# Patient Record
Sex: FEMALE | Race: WHITE | NOT HISPANIC OR LATINO | Employment: OTHER | URBAN - METROPOLITAN AREA
[De-identification: names, ages, dates, MRNs, and addresses within clinical notes are randomized per-mention and may not be internally consistent; named-entity substitution may affect disease eponyms.]

---

## 2019-07-11 ENCOUNTER — HOSPITAL ENCOUNTER (OUTPATIENT)
Facility: HOSPITAL | Age: 69
Setting detail: OBSERVATION
LOS: 1 days | Discharge: HOME | End: 2019-07-12
Attending: EMERGENCY MEDICINE | Admitting: INTERNAL MEDICINE

## 2019-07-11 ENCOUNTER — APPOINTMENT (OUTPATIENT)
Dept: CT IMAGING | Facility: HOSPITAL | Age: 69
End: 2019-07-11

## 2019-07-11 DIAGNOSIS — E86.0 DEHYDRATION: ICD-10-CM

## 2019-07-11 DIAGNOSIS — E87.0 ACUTE HYPERNATREMIA: ICD-10-CM

## 2019-07-11 DIAGNOSIS — R55 SYNCOPE, UNSPECIFIED SYNCOPE TYPE: Primary | ICD-10-CM

## 2019-07-11 DIAGNOSIS — G30.1 LATE ONSET ALZHEIMER'S DISEASE WITHOUT BEHAVIORAL DISTURBANCE (HCC): ICD-10-CM

## 2019-07-11 DIAGNOSIS — F02.80 LATE ONSET ALZHEIMER'S DISEASE WITHOUT BEHAVIORAL DISTURBANCE (HCC): ICD-10-CM

## 2019-07-11 PROBLEM — R33.8 ACUTE URINARY RETENTION: Status: ACTIVE | Noted: 2019-07-11

## 2019-07-11 PROBLEM — G30.0 EARLY ONSET ALZHEIMER'S DEMENTIA (HCC): Status: ACTIVE | Noted: 2019-07-11

## 2019-07-11 PROBLEM — N39.490 OVERFLOW INCONTINENCE: Status: ACTIVE | Noted: 2019-07-11

## 2019-07-11 LAB
ALBUMIN SERPL-MCNC: 3.7 G/DL (ref 3.5–5.2)
ALBUMIN/GLOB SERPL: 1.4 G/DL
ALP SERPL-CCNC: 116 U/L (ref 39–117)
ALT SERPL W P-5'-P-CCNC: 20 U/L (ref 1–33)
ANION GAP SERPL CALCULATED.3IONS-SCNC: 10.2 MMOL/L (ref 5–15)
AST SERPL-CCNC: 26 U/L (ref 1–32)
BASOPHILS # BLD AUTO: 0.03 10*3/MM3 (ref 0–0.2)
BASOPHILS NFR BLD AUTO: 0.5 % (ref 0–1.5)
BILIRUB SERPL-MCNC: 0.4 MG/DL (ref 0.2–1.2)
BILIRUB UR QL STRIP: NEGATIVE
BUN BLD-MCNC: 16 MG/DL (ref 8–23)
BUN/CREAT SERPL: 16.8 (ref 7–25)
CALCIUM SPEC-SCNC: 8.8 MG/DL (ref 8.6–10.5)
CHLORIDE SERPL-SCNC: 110 MMOL/L (ref 98–107)
CLARITY UR: CLEAR
CO2 SERPL-SCNC: 28.8 MMOL/L (ref 22–29)
COLOR UR: YELLOW
CREAT BLD-MCNC: 0.95 MG/DL (ref 0.57–1)
DEPRECATED RDW RBC AUTO: 50.3 FL (ref 37–54)
EOSINOPHIL # BLD AUTO: 0.19 10*3/MM3 (ref 0–0.4)
EOSINOPHIL NFR BLD AUTO: 3 % (ref 0.3–6.2)
ERYTHROCYTE [DISTWIDTH] IN BLOOD BY AUTOMATED COUNT: 14 % (ref 12.3–15.4)
ETHANOL BLD-MCNC: <10 MG/DL (ref 0–10)
ETHANOL UR QL: <0.01 %
GFR SERPL CREATININE-BSD FRML MDRD: 58 ML/MIN/1.73
GLOBULIN UR ELPH-MCNC: 2.7 GM/DL
GLUCOSE BLD-MCNC: 111 MG/DL (ref 65–99)
GLUCOSE BLDC GLUCOMTR-MCNC: 119 MG/DL (ref 70–130)
GLUCOSE UR STRIP-MCNC: NEGATIVE MG/DL
HCT VFR BLD AUTO: 42 % (ref 34–46.6)
HGB BLD-MCNC: 13.2 G/DL (ref 12–15.9)
HGB UR QL STRIP.AUTO: NEGATIVE
IMM GRANULOCYTES # BLD AUTO: 0.02 10*3/MM3 (ref 0–0.05)
IMM GRANULOCYTES NFR BLD AUTO: 0.3 % (ref 0–0.5)
INR PPP: 1.19 (ref 0.9–1.1)
KETONES UR QL STRIP: NEGATIVE
LEUKOCYTE ESTERASE UR QL STRIP.AUTO: NEGATIVE
LYMPHOCYTES # BLD AUTO: 1.79 10*3/MM3 (ref 0.7–3.1)
LYMPHOCYTES NFR BLD AUTO: 28 % (ref 19.6–45.3)
MCH RBC QN AUTO: 30.8 PG (ref 26.6–33)
MCHC RBC AUTO-ENTMCNC: 31.4 G/DL (ref 31.5–35.7)
MCV RBC AUTO: 97.9 FL (ref 79–97)
MONOCYTES # BLD AUTO: 0.7 10*3/MM3 (ref 0.1–0.9)
MONOCYTES NFR BLD AUTO: 11 % (ref 5–12)
NEUTROPHILS # BLD AUTO: 3.66 10*3/MM3 (ref 1.7–7)
NEUTROPHILS NFR BLD AUTO: 57.2 % (ref 42.7–76)
NITRITE UR QL STRIP: NEGATIVE
NRBC BLD AUTO-RTO: 0 /100 WBC (ref 0–0.2)
PH UR STRIP.AUTO: 7 [PH] (ref 5–8)
PLATELET # BLD AUTO: 262 10*3/MM3 (ref 140–450)
PMV BLD AUTO: 9.9 FL (ref 6–12)
POTASSIUM BLD-SCNC: 3.9 MMOL/L (ref 3.5–5.2)
PROT SERPL-MCNC: 6.4 G/DL (ref 6–8.5)
PROT UR QL STRIP: NEGATIVE
PROTHROMBIN TIME: 14.8 SECONDS (ref 11.7–14.2)
RBC # BLD AUTO: 4.29 10*6/MM3 (ref 3.77–5.28)
SODIUM BLD-SCNC: 149 MMOL/L (ref 136–145)
SP GR UR STRIP: >=1.03 (ref 1–1.03)
UROBILINOGEN UR QL STRIP: NORMAL
WBC NRBC COR # BLD: 6.39 10*3/MM3 (ref 3.4–10.8)

## 2019-07-11 PROCEDURE — 0042T HC CT CEREBRAL PERFUSION W/WO CONTRAST: CPT

## 2019-07-11 PROCEDURE — 85025 COMPLETE CBC W/AUTO DIFF WBC: CPT | Performed by: EMERGENCY MEDICINE

## 2019-07-11 PROCEDURE — 80307 DRUG TEST PRSMV CHEM ANLYZR: CPT | Performed by: EMERGENCY MEDICINE

## 2019-07-11 PROCEDURE — 99205 OFFICE O/P NEW HI 60 MIN: CPT | Performed by: PSYCHIATRY & NEUROLOGY

## 2019-07-11 PROCEDURE — 70498 CT ANGIOGRAPHY NECK: CPT

## 2019-07-11 PROCEDURE — 81003 URINALYSIS AUTO W/O SCOPE: CPT | Performed by: EMERGENCY MEDICINE

## 2019-07-11 PROCEDURE — 0 IOPAMIDOL PER 1 ML: Performed by: EMERGENCY MEDICINE

## 2019-07-11 PROCEDURE — 96372 THER/PROPH/DIAG INJ SC/IM: CPT

## 2019-07-11 PROCEDURE — 92610 EVALUATE SWALLOWING FUNCTION: CPT

## 2019-07-11 PROCEDURE — 51702 INSERT TEMP BLADDER CATH: CPT

## 2019-07-11 PROCEDURE — 85610 PROTHROMBIN TIME: CPT | Performed by: EMERGENCY MEDICINE

## 2019-07-11 PROCEDURE — 80053 COMPREHEN METABOLIC PANEL: CPT | Performed by: EMERGENCY MEDICINE

## 2019-07-11 PROCEDURE — 82565 ASSAY OF CREATININE: CPT

## 2019-07-11 PROCEDURE — 70496 CT ANGIOGRAPHY HEAD: CPT

## 2019-07-11 PROCEDURE — 82962 GLUCOSE BLOOD TEST: CPT

## 2019-07-11 PROCEDURE — 93005 ELECTROCARDIOGRAM TRACING: CPT | Performed by: EMERGENCY MEDICINE

## 2019-07-11 PROCEDURE — 25010000002 ENOXAPARIN PER 10 MG: Performed by: INTERNAL MEDICINE

## 2019-07-11 PROCEDURE — 99285 EMERGENCY DEPT VISIT HI MDM: CPT

## 2019-07-11 PROCEDURE — 93010 ELECTROCARDIOGRAM REPORT: CPT | Performed by: INTERNAL MEDICINE

## 2019-07-11 RX ORDER — MULTIVITAMIN
1 TABLET ORAL DAILY
Status: ON HOLD | COMMUNITY
End: 2021-03-14

## 2019-07-11 RX ORDER — DONEPEZIL HYDROCHLORIDE 10 MG/1
10 TABLET, FILM COATED ORAL NIGHTLY
Status: DISCONTINUED | OUTPATIENT
Start: 2019-07-11 | End: 2019-07-12 | Stop reason: HOSPADM

## 2019-07-11 RX ORDER — BUSPIRONE HYDROCHLORIDE 15 MG/1
15 TABLET ORAL 3 TIMES DAILY
Status: ON HOLD | COMMUNITY
End: 2021-03-14

## 2019-07-11 RX ORDER — ACYCLOVIR 200 MG/1
CAPSULE ORAL
COMMUNITY
End: 2019-07-12 | Stop reason: HOSPADM

## 2019-07-11 RX ORDER — ATORVASTATIN CALCIUM 20 MG/1
20 TABLET, FILM COATED ORAL DAILY
Status: DISCONTINUED | OUTPATIENT
Start: 2019-07-11 | End: 2019-07-12 | Stop reason: HOSPADM

## 2019-07-11 RX ORDER — MEMANTINE HYDROCHLORIDE 10 MG/1
10 TABLET ORAL EVERY 12 HOURS SCHEDULED
Status: DISCONTINUED | OUTPATIENT
Start: 2019-07-11 | End: 2019-07-12 | Stop reason: HOSPADM

## 2019-07-11 RX ORDER — ATORVASTATIN CALCIUM 20 MG/1
20 TABLET, FILM COATED ORAL DAILY
Status: ON HOLD | COMMUNITY
End: 2021-03-14

## 2019-07-11 RX ORDER — SODIUM CHLORIDE 0.9 % (FLUSH) 0.9 %
3 SYRINGE (ML) INJECTION EVERY 12 HOURS SCHEDULED
Status: DISCONTINUED | OUTPATIENT
Start: 2019-07-11 | End: 2019-07-12 | Stop reason: HOSPADM

## 2019-07-11 RX ORDER — ALPRAZOLAM 1 MG/1
1 TABLET ORAL 2 TIMES DAILY PRN
Status: ON HOLD | COMMUNITY
End: 2021-03-14

## 2019-07-11 RX ORDER — RISPERIDONE 0.5 MG/1
0.5 TABLET ORAL 2 TIMES DAILY
COMMUNITY
End: 2019-07-12 | Stop reason: HOSPADM

## 2019-07-11 RX ORDER — BUSPIRONE HYDROCHLORIDE 15 MG/1
15 TABLET ORAL 3 TIMES DAILY
Status: DISCONTINUED | OUTPATIENT
Start: 2019-07-11 | End: 2019-07-12 | Stop reason: HOSPADM

## 2019-07-11 RX ORDER — ONDANSETRON 4 MG/1
4 TABLET, FILM COATED ORAL EVERY 6 HOURS PRN
Status: DISCONTINUED | OUTPATIENT
Start: 2019-07-11 | End: 2019-07-12 | Stop reason: HOSPADM

## 2019-07-11 RX ORDER — SODIUM CHLORIDE 0.9 % (FLUSH) 0.9 %
10 SYRINGE (ML) INJECTION AS NEEDED
Status: DISCONTINUED | OUTPATIENT
Start: 2019-07-11 | End: 2019-07-12 | Stop reason: HOSPADM

## 2019-07-11 RX ORDER — MEMANTINE HYDROCHLORIDE 10 MG/1
10 TABLET ORAL 2 TIMES DAILY
Status: ON HOLD | COMMUNITY
End: 2021-03-14

## 2019-07-11 RX ORDER — PROPRANOLOL HYDROCHLORIDE 60 MG/1
60 TABLET ORAL 3 TIMES DAILY
Status: ON HOLD | COMMUNITY
End: 2019-08-18 | Stop reason: SDUPTHER

## 2019-07-11 RX ORDER — OLANZAPINE 10 MG/1
7.5 INJECTION, POWDER, LYOPHILIZED, FOR SOLUTION INTRAMUSCULAR ONCE
Status: DISCONTINUED | OUTPATIENT
Start: 2019-07-11 | End: 2019-07-12 | Stop reason: HOSPADM

## 2019-07-11 RX ORDER — MAGNESIUM GLUCONATE 27 MG(500)
27 TABLET ORAL 2 TIMES DAILY
COMMUNITY
End: 2019-07-12 | Stop reason: HOSPADM

## 2019-07-11 RX ORDER — DESOXIMETASONE 2.5 MG/G
CREAM TOPICAL 2 TIMES DAILY
COMMUNITY
End: 2019-07-12 | Stop reason: HOSPADM

## 2019-07-11 RX ORDER — DONEPEZIL HYDROCHLORIDE 10 MG/1
10 TABLET, FILM COATED ORAL NIGHTLY
COMMUNITY
End: 2019-07-12 | Stop reason: HOSPADM

## 2019-07-11 RX ORDER — ONDANSETRON 2 MG/ML
4 INJECTION INTRAMUSCULAR; INTRAVENOUS EVERY 6 HOURS PRN
Status: DISCONTINUED | OUTPATIENT
Start: 2019-07-11 | End: 2019-07-12 | Stop reason: HOSPADM

## 2019-07-11 RX ORDER — ACETAMINOPHEN 325 MG/1
650 TABLET ORAL EVERY 4 HOURS PRN
Status: DISCONTINUED | OUTPATIENT
Start: 2019-07-11 | End: 2019-07-12 | Stop reason: HOSPADM

## 2019-07-11 RX ORDER — SODIUM CHLORIDE 0.9 % (FLUSH) 0.9 %
3-10 SYRINGE (ML) INJECTION AS NEEDED
Status: DISCONTINUED | OUTPATIENT
Start: 2019-07-11 | End: 2019-07-12 | Stop reason: HOSPADM

## 2019-07-11 RX ORDER — ALPRAZOLAM 0.5 MG/1
1 TABLET ORAL 2 TIMES DAILY PRN
Status: DISCONTINUED | OUTPATIENT
Start: 2019-07-11 | End: 2019-07-12 | Stop reason: HOSPADM

## 2019-07-11 RX ORDER — RISPERIDONE 0.5 MG/1
0.5 TABLET ORAL EVERY 12 HOURS SCHEDULED
Status: DISCONTINUED | OUTPATIENT
Start: 2019-07-11 | End: 2019-07-12 | Stop reason: HOSPADM

## 2019-07-11 RX ADMIN — ENOXAPARIN SODIUM 40 MG: 40 INJECTION SUBCUTANEOUS at 18:19

## 2019-07-11 RX ADMIN — DONEPEZIL HYDROCHLORIDE 10 MG: 10 TABLET, FILM COATED ORAL at 20:45

## 2019-07-11 RX ADMIN — ALPRAZOLAM 0.5 MG: 0.5 TABLET ORAL at 18:20

## 2019-07-11 RX ADMIN — BUSPIRONE HYDROCHLORIDE 15 MG: 15 TABLET ORAL at 20:45

## 2019-07-11 RX ADMIN — Medication 1 TABLET: at 18:19

## 2019-07-11 RX ADMIN — SODIUM CHLORIDE, PRESERVATIVE FREE 3 ML: 5 INJECTION INTRAVENOUS at 20:46

## 2019-07-11 RX ADMIN — MEMANTINE HYDROCHLORIDE 10 MG: 10 TABLET, FILM COATED ORAL at 20:45

## 2019-07-11 RX ADMIN — PROPRANOLOL HYDROCHLORIDE 60 MG: 40 TABLET ORAL at 18:19

## 2019-07-11 RX ADMIN — SODIUM CHLORIDE 1000 ML: 9 INJECTION, SOLUTION INTRAVENOUS at 09:49

## 2019-07-11 RX ADMIN — IOPAMIDOL 150 ML: 755 INJECTION, SOLUTION INTRAVENOUS at 09:40

## 2019-07-11 NOTE — PLAN OF CARE
Problem: Patient Care Overview  Goal: Plan of Care Review  Outcome: Ongoing (interventions implemented as appropriate)   07/11/19 3943   Coping/Psychosocial   Plan of Care Reviewed With patient   OTHER   Outcome Summary Bedside swallow eval completed. Recommmend regular and thins, meds whole with thin. Recommend upright and slow rate. ST to s/o at this time.

## 2019-07-11 NOTE — ED PROVIDER NOTES
EMERGENCY DEPARTMENT ENCOUNTER    CHIEF COMPLAINT  Chief Complaint: AMS  History given by: patient  History limited by: AMS  Room Number: 03/03  PMD: Zee Daley MD      HPI:  Pt is a 69 y.o. female who presents complaining of AMS that was evaluated today per EMS. EMS states the pt's  called after hearing a fall and found pt unrespnsive in a puddle of urine. EMS notes the  called te PCP and he referred them to call EMS. EMS reports the p's speech was slow and slurred. EMS states hx of dementia but the  states the pt is normally healhty. Pt denies pain in extremities, HA, trouble swallowing, abd pain, and visual changes.    Duration:  today  Onset: sudden  Timing: constant  Quality: AMS  Intensity/Severity: mild  Progression: unchanged  Associated Symptoms: none specified  Aggravating Factors: unknown  Alleviating Factors: unknown  Previous Episodes: unknown  Treatment before arrival: unknown    PAST MEDICAL HISTORY  Active Ambulatory Problems     Diagnosis Date Noted   • No Active Ambulatory Problems     Resolved Ambulatory Problems     Diagnosis Date Noted   • No Resolved Ambulatory Problems     Past Medical History:   Diagnosis Date   • Dementia    • Hyperlipidemia    • Hypertension    • UTI (urinary tract infection)        PAST SURGICAL HISTORY  History reviewed. No pertinent surgical history.    FAMILY HISTORY  History reviewed. No pertinent family history.    SOCIAL HISTORY  Social History     Socioeconomic History   • Marital status:      Spouse name: Not on file   • Number of children: Not on file   • Years of education: Not on file   • Highest education level: Not on file   Tobacco Use   • Smoking status: Never Smoker   Substance and Sexual Activity   • Alcohol use: No     Frequency: Never   • Drug use: No   • Sexual activity: Defer       ALLERGIES  Cyproheptadine and Levaquin [levofloxacin]    REVIEW OF SYSTEMS  Review of Systems   Unable to perform ROS: Mental status  change   HENT: Negative for trouble swallowing.    Eyes: Negative for visual disturbance.   Gastrointestinal: Negative for abdominal pain.   Musculoskeletal: Negative for arthralgias and myalgias.   Neurological: Negative for headaches.       PHYSICAL EXAM  ED Triage Vitals   Temp Heart Rate Resp BP SpO2   07/11/19 0920 07/11/19 0913 07/11/19 0913 07/11/19 0913 07/11/19 0913   97.4 °F (36.3 °C) 55 18 102/61 95 %      Temp src Heart Rate Source Patient Position BP Location FiO2 (%)   07/11/19 0920 07/11/19 1148 07/11/19 0913 07/11/19 0913 --   Oral Monitor Lying Right arm          Physical Exam   Constitutional: No distress.   HENT:   Head: Normocephalic and atraumatic.   Eyes: EOM are normal. Pupils are equal, round, and reactive to light.   Pupils are 2mm   Neck: Normal range of motion. Neck supple.   Cardiovascular: Normal rate, regular rhythm and normal heart sounds.   Pulmonary/Chest: Effort normal and breath sounds normal. No respiratory distress.   Abdominal: Soft. Bowel sounds are normal. She exhibits no distension. There is no tenderness. There is no rebound and no guarding.   Musculoskeletal: Normal range of motion. She exhibits no edema.   Neurological: She is alert. She has normal sensation and normal strength. She displays no weakness and facial symmetry.   Pt is sleepy but awakes to questioning, pt is confused and is somewhat cooperative with commands, moderate dysarthria, no aphasia noted   Skin: Skin is warm and dry. No abrasion, no bruising and no rash noted.   Nursing note and vitals reviewed.      LAB RESULTS  Lab Results (last 24 hours)     Procedure Component Value Units Date/Time    POC Glucose Once [[id number]]  (Normal) Collected:  07/11/19 0926    Specimen:  Blood Updated:  07/11/19 0927     Glucose 119 mg/dL     CBC & Differential [[id number]] Collected:  07/11/19 0928    Specimen:  Blood Updated:  07/11/19 0934    Narrative:       The following orders were created for panel order CBC &  Differential.  Procedure                               Abnormality         Status                     ---------                               -----------         ------                     CBC Auto Differential[[id number]]        Abnormal            Final result                 Please view results for these tests on the individual orders.    Comprehensive Metabolic Panel [[id number]]  (Abnormal) Collected:  07/11/19 0928    Specimen:  Blood Updated:  07/11/19 0949     Glucose 111 mg/dL      BUN 16 mg/dL      Creatinine 0.95 mg/dL      Sodium 149 mmol/L      Potassium 3.9 mmol/L      Chloride 110 mmol/L      CO2 28.8 mmol/L      Calcium 8.8 mg/dL      Total Protein 6.4 g/dL      Albumin 3.70 g/dL      ALT (SGPT) 20 U/L      AST (SGOT) 26 U/L      Alkaline Phosphatase 116 U/L      Total Bilirubin 0.4 mg/dL      eGFR Non African Amer 58 mL/min/1.73      Globulin 2.7 gm/dL      A/G Ratio 1.4 g/dL      BUN/Creatinine Ratio 16.8     Anion Gap 10.2 mmol/L     Narrative:       GFR Normal >60  Chronic Kidney Disease <60  Kidney Failure <15    Protime-INR [[id number]]  (Abnormal) Collected:  07/11/19 0928    Specimen:  Blood Updated:  07/11/19 0942     Protime 14.8 Seconds      INR 1.19    CBC Auto Differential [[id number]]  (Abnormal) Collected:  07/11/19 0928    Specimen:  Blood Updated:  07/11/19 0934     WBC 6.39 10*3/mm3      RBC 4.29 10*6/mm3      Hemoglobin 13.2 g/dL      Hematocrit 42.0 %      MCV 97.9 fL      MCH 30.8 pg      MCHC 31.4 g/dL      RDW 14.0 %      RDW-SD 50.3 fl      MPV 9.9 fL      Platelets 262 10*3/mm3      Neutrophil % 57.2 %      Lymphocyte % 28.0 %      Monocyte % 11.0 %      Eosinophil % 3.0 %      Basophil % 0.5 %      Immature Grans % 0.3 %      Neutrophils, Absolute 3.66 10*3/mm3      Lymphocytes, Absolute 1.79 10*3/mm3      Monocytes, Absolute 0.70 10*3/mm3      Eosinophils, Absolute 0.19 10*3/mm3      Basophils, Absolute 0.03 10*3/mm3      Immature Grans, Absolute 0.02 10*3/mm3      nRBC 0.0  /100 WBC     Ethanol [[id number]] Collected:  07/11/19 0928    Specimen:  Blood Updated:  07/11/19 0949     Ethanol <10 mg/dL      Ethanol % <0.010 %     Urinalysis With Culture If Indicated - Urine, Catheter [[id number]]  (Normal) Collected:  07/11/19 1000    Specimen:  Urine, Catheter Updated:  07/11/19 1007     Color, UA Yellow     Appearance, UA Clear     pH, UA 7.0     Specific Gravity, UA >=1.030     Glucose, UA Negative     Ketones, UA Negative     Bilirubin, UA Negative     Blood, UA Negative     Protein, UA Negative     Leuk Esterase, UA Negative     Nitrite, UA Negative     Urobilinogen, UA 0.2 E.U./dL    Narrative:       Urine microscopic not indicated.          I ordered the above labs and reviewed the results    RADIOLOGY  CT Angiogram Head With & Without Contrast   Final Result   No acute process identified on this CT of the brain with and without   contrast, CT angiography of the head and neck with contrast and CT   cerebral perfusion images.       Radiation dose reduction techniques were utilized, including automated   exposure control and exposure modulation based on body size.       This report was finalized on 7/11/2019 10:44 AM by Dr. Cameron Hutchins M.D.          CT Angiogram Neck With & Without Contrast   Final Result   No acute process identified on this CT of the brain with and without   contrast, CT angiography of the head and neck with contrast and CT   cerebral perfusion images.       Radiation dose reduction techniques were utilized, including automated   exposure control and exposure modulation based on body size.       This report was finalized on 7/11/2019 10:44 AM by Dr. Cameron Hutchins M.D.          CT Cerebral Perfusion With & Without Contrast   Final Result   No acute process identified on this CT of the brain with and without   contrast, CT angiography of the head and neck with contrast and CT   cerebral perfusion images.       Radiation dose reduction techniques were  utilized, including automated   exposure control and exposure modulation based on body size.       This report was finalized on 7/11/2019 10:44 AM by Dr. Cameron Hutchins M.D.               I ordered the above noted radiological studies. Interpreted by radiologist. Reviewed by me in PACS.       PROCEDURES  Critical Care  Performed by: Brian Castro MD  Authorized by: Brian Castro MD     Critical care provider statement:     Critical care time (minutes):  35    Critical care time was exclusive of:  Separately billable procedures and treating other patients    Critical care was necessary to treat or prevent imminent or life-threatening deterioration of the following conditions:  CNS failure or compromise    Critical care was time spent personally by me on the following activities:  Development of treatment plan with patient or surrogate, discussions with consultants, discussions with primary provider, evaluation of patient's response to treatment, examination of patient, obtaining history from patient or surrogate, ordering and performing treatments and interventions, ordering and review of laboratory studies, ordering and review of radiographic studies, pulse oximetry and re-evaluation of patient's condition           1a. Level of Consciousness: 1-->Not alert, but arousable by minor stimulation to obey, answer, or respond  1b. LOC Questions: 0-->Answers both questions correctly  1c. LOC Commands: 0-->Performs both tasks correctly  2. Best Gaze: 0-->Normal  3. Visual: 0-->No visual loss  4. Facial Palsy: 0-->Normal symmetrical movements  5a. Motor Arm, Left: 0-->No drift, limb holds 90 (or 45) degrees for full 10 secs  5b. Motor Arm, Right: 0-->No drift, limb holds 90 (or 45) degrees for full 10 secs  6a. Motor Leg, Left: 0-->No drift, leg holds 30 degree position for full 5 secs  6b. Motor Leg, Right: 0-->No drift, leg holds 30 degree position for full 5 secs  7. Limb Ataxia: 0-->Absent  8. Sensory:  0-->Normal, no sensory loss  9. Best Language: 0-->No aphasia, normal  10. Dysarthria: 1-->Mild-to-moderate dysarthria, patient slurs at least some words and, at worst, can be understood with some difficulty  11. Extinction and Inattention (formerly Neglect): 0-->No abnormality    Total (NIH Stroke Scale): 2    EKG          EKG time: 0951  Rhythm/Rate: SR rate 58  P waves and AR: nml  QRS, axis: RAD   ST and T waves: nml     Interpreted Contemporaneously by me, independently viewed and no previus for comparison.        PROGRESS AND CONSULTS     0921- Discussed pt's case with Dr. Rodas (stroke neurology) who agrees with plan the plan of care and request a Team D protocol be put into place. Team D protocol put into place.    0924- Labs, CTA head, neck, and cerebral perfusion ordered for further evaluation.    0925- Rechecked the pt who is resting comfortably in NAD. Pt's   is now at the beside and state this all occurred at 0800 this morning. Pt's  notes the pt has taken all of her medication except the Xanax this morning. He also states that he keep keeps the pt's medications locked up and he is solely in control of them. Pt's  states that the pt had a similar episode a few years ago and the workup was negative. The  states the pt went for a walk last night and was able to accomplish around 10 flights of stairs. D/w pt's  the plan up to this point. He notes hx of UTI's.    0956- After bedside evaluation, Dr. Rodas agrees with the plan of care at this time. He reviewed the imaging and agrees this could have been a syncopal episode but requests an EEG. Pt does not need tPA at this time.    1028- Call placed to [facility].    1033- Rechecked pt who is resting comfortably in NAD. Pt is more alert.  Informed pt of the lab and imaging results. D/w pt the plan to admit for further care. Pt understands and agrees with plan. All questions answered.      1125- Discussed pt's case with   Constantine ([facility]) who agrees with plan to admit the pt for further care.              MEDICATIONS GIVEN IN ER  Medications   sodium chloride 0.9 % flush 10 mL (not administered)   sodium chloride 0.9 % bolus 1,000 mL (0 mL Intravenous Stopped 7/11/19 1117)   iopamidol (ISOVUE-370) 76 % injection 150 mL (150 mL Intravenous Given by Other 7/11/19 0915)         MEDICAL DECISION MAKING  Results were reviewed/discussed with the patient and they were also made aware of online access. Pt also made aware that some labs, such as cultures, will not be resulted during ER visit and follow up with PMD is necessary.     MDM  Number of Diagnoses or Management Options  Acute hypernatremia:   Dehydration:   Late onset Alzheimer's disease without behavioral disturbance:   Syncope, unspecified syncope type:      Amount and/or Complexity of Data Reviewed  Clinical lab tests: ordered and reviewed (Protime: 14.8  INR: 1.19)  Tests in the radiology section of CPT®: ordered and reviewed (See radiology report)  Tests in the medicine section of CPT®: reviewed and ordered (See Stroke Score note  See EKG note)  Discuss the patient with other providers: yes (Dr. Rodas (stroke neuro)  Dr. Fitch ([facility]))    Critical Care  Total time providing critical care: 30-74 minutes         DIAGNOSIS  Final diagnoses:   Syncope, unspecified syncope type   Late onset Alzheimer's disease without behavioral disturbance   Acute hypernatremia   Dehydration       DISPOSITION  ADMISSION    Discussed treatment plan and reason for admission with pt/family and admitting physician.  Pt/family voiced understanding of the plan for admission for further testing/treatment as needed.         Latest Documented Vital Signs:  As of 11:28 AM  BP- 128/53 HR- 56 Temp- 97.4 °F (36.3 °C) (Oral) O2 sat- 98%    --  Documentation assistance provided by ashely Reynoso for Dr. Castro.  Information recorded by the ashely was done at my direction and has been verified and validated by  me.       Ema Reynoso  07/11/19 1129       Ema Reynoso  07/11/19 1134       Brian Castro MD  07/11/19 0090

## 2019-07-11 NOTE — H&P
Name: Dee Dee Cox ADMIT: 2019   : 1950  PCP: Zee Daley MD    MRN: [id number] LOS: 0 days   AGE/SEX: 69 y.o. 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  She was diagnosed with dementia 4 years ago.  She was fine yesterday and the day before.  After breakfast today she went into another room and her  heard a thud.  He came in and found her on the floor sitting in urine.  Patient is awake but confused.  She is not able to provide any information regarding today's events.  She does deny dysuria.  No fever or chills.  Appetite is good.  She has not lost weight.  No other associated symptoms or exacerbating or alleviating factors      Past Medical History:   Diagnosis Date   • Dementia    • Hyperlipidemia    • Hypertension    • UTI (urinary tract infection)        History reviewed. No pertinent surgical history.    Medications Prior to Admission   Medication Sig Dispense Refill Last Dose   • acyclovir (ZOVIRAX) 200 MG capsule Take  by mouth Every 4 (Four) Hours While Awake.      • ALPRAZolam (XANAX) 1 MG tablet Take 1 mg by mouth 2 (Two) Times a Day As Needed for Anxiety (pt takes 0.5 mg PO in the afternoo and at bedtime).      • atorvastatin (LIPITOR) 20 MG tablet Take 20 mg by mouth Daily.      • busPIRone (BUSPAR) 15 MG tablet Take 15 mg by mouth 3 (Three) Times a Day.      • calcium citrate-vitamin d (CITRACAL) 200-250 MG-UNIT tablet tablet Take  by mouth Daily.      • desoximetasone (TOPICORT) 0.25 % cream Apply  topically to the appropriate area as directed 2 (Two) Times a Day.      • donepezil (ARICEPT) 10 MG tablet Take 10 mg by mouth Every Night.      • folic acid-vit B6-vit B12 (FOLTABS) 0.8-10-0.115 MG tablet tablet Take  by mouth Daily.      • Levomilnacipran HCl ER (FETZIMA) 40 MG capsule sustained-release 24 hr Take  by mouth.      • magnesium  gluconate (MAGONATE) 500 MG tablet Take 27 mg by mouth 2 (Two) Times a Day.      • memantine (NAMENDA) 10 MG tablet Take 10 mg by mouth 2 (Two) Times a Day.      • Multiple Vitamin tablet Take 1 tablet by mouth Daily.      • propranolol (INDERAL) 60 MG tablet Take 60 mg by mouth 3 (Three) Times a Day.      • risperiDONE (risperDAL) 0.5 MG tablet Take 0.5 mg by mouth 2 (Two) Times a Day.      • Selenium (SELENICAPS-200 PO) Take 200 mcg by mouth.      • triamcinolone (KENALOG) 0.1 % ointment Apply  topically to the appropriate area as directed 2 (Two) Times a Day.        Allergies:  Cyproheptadine and Levaquin [levofloxacin]    Social History     Tobacco Use   • Smoking status: Never Smoker   Substance Use Topics   • Alcohol use: No     Frequency: Never   • Drug use: No   .  Retired internist    History reviewed. No pertinent family history.    Review of Systems-unreliable secondary to dementia.   says that she has usually just 1 cup of coffee a day and 16 ounces of Diet Coke.  No constipation or diarrhea.      Vital Signs  Temp:  [97.4 °F (36.3 °C)] 97.4 °F (36.3 °C)  Heart Rate:  [53-67] 61  Resp:  [15-18] 16  BP: (102-163)/(53-89) 148/75  SpO2:  [95 %-100 %] 97 %  on   ;   Device (Oxygen Therapy): room air  Body mass index is 25.75 kg/m².    Physical Exam   Constitutional: She appears well-developed and well-nourished. No distress.   HENT:   Head: Normocephalic and atraumatic.   Right Ear: External ear normal.   Left Ear: External ear normal.   Nose: Nose normal.   Mouth/Throat: Oropharynx is clear and moist. No oropharyngeal exudate.   Eyes: Conjunctivae and EOM are normal. Pupils are equal, round, and reactive to light. Right eye exhibits no discharge. Left eye exhibits no discharge. No scleral icterus.   Neck: Normal range of motion. Neck supple. No JVD present. No tracheal deviation present. No thyromegaly present.   Cardiovascular: Normal rate, regular rhythm, normal heart sounds and intact  distal pulses.   No murmur heard.  Pulmonary/Chest: Effort normal and breath sounds normal. No stridor. No respiratory distress. She has no wheezes.   Abdominal: Soft. Bowel sounds are normal. She exhibits no distension.   No hepatosplenomegaly   Musculoskeletal: She exhibits no edema or deformity.   Lymphadenopathy:     She has no cervical adenopathy.   Neurological: She is alert. No cranial nerve deficit.   Oriented to self   Skin: Skin is warm and dry. She is not diaphoretic.   Psychiatric:   Agitated earlier.  Distracted.  Tangential   Nursing note and vitals reviewed.      Results Review:   I reviewed the patient's new clinical results.    Lab Results   Component Value Date    GLUCOSE 111 (H) 07/11/2019    BUN 16 07/11/2019    CREATININE 0.95 07/11/2019    EGFRIFNONA 58 (L) 07/11/2019    BCR 16.8 07/11/2019    K 3.9 07/11/2019    CO2 28.8 07/11/2019    CALCIUM 8.8 07/11/2019    ALBUMIN 3.70 07/11/2019    AST 26 07/11/2019    ALT 20 07/11/2019       Lab Results   Component Value Date    WBC 6.39 07/11/2019    HGB 13.2 07/11/2019    HCT 42.0 07/11/2019    MCV 97.9 (H) 07/11/2019     07/11/2019       Results from last 7 days   Lab Units 07/11/19  0928   INR  1.19*       Imaging Results (last 24 hours)     Procedure Component Value Units Date/Time    CT Angiogram Head With & Without Contrast [[id number]] Collected:  07/11/19 1024     Updated:  07/11/19 1047    Narrative:       CT OF THE BRAIN WITH AND WITHOUT CONTRAST, CT ANGIOGRAPHY OF THE HEAD  AND NECK WITH CONTRAST INCLUDING RECONSTRUCTION IMAGES AND CT CEREBRAL  PERFUSION IMAGES     INDICATION: Stroke.         TECHNIQUE: Axial images were obtained through the brain without  intravenous contrast.     FINDINGS: There is mild-to-moderate diffuse atrophy. There is mild  decreased attenuation of the periventricular white matter bilaterally  consistent with mild small vessel white matter ischemic disease.     Following the intravenous contrast injection CT  angiography was  performed through the head and neck.     Sagittal, coronal and 3D reconstruction images were reviewed.     NASCET criteria was used.     No significant carotid stenosis is seen. The bilateral common carotid  and the bilateral internal and external carotid arteries appear patent.  Distal internal carotid arteries appear patent. The bilateral middle and  anterior cerebral arteries appear well-opacified.     Bilateral vertebral arteries and the basilar artery and its branches  appear patent. No aneurysm is seen. No vascular thrombus or occlusion is  seen.     Postcontrast CT of the brain shows no abnormal enhancement.     CT cerebral perfusion images show no evidence of completed infarct or  significant brain at risk.       Impression:       No acute process identified on this CT of the brain with and without  contrast, CT angiography of the head and neck with contrast and CT  cerebral perfusion images.     Radiation dose reduction techniques were utilized, including automated  exposure control and exposure modulation based on body size.     This report was finalized on 7/11/2019 10:44 AM by Dr. Cameron Hutchins M.D.       CT Angiogram Neck With & Without Contrast [[id number]] Collected:  07/11/19 1024     Updated:  07/11/19 1047    Narrative:       CT OF THE BRAIN WITH AND WITHOUT CONTRAST, CT ANGIOGRAPHY OF THE HEAD  AND NECK WITH CONTRAST INCLUDING RECONSTRUCTION IMAGES AND CT CEREBRAL  PERFUSION IMAGES     INDICATION: Stroke.         TECHNIQUE: Axial images were obtained through the brain without  intravenous contrast.     FINDINGS: There is mild-to-moderate diffuse atrophy. There is mild  decreased attenuation of the periventricular white matter bilaterally  consistent with mild small vessel white matter ischemic disease.     Following the intravenous contrast injection CT angiography was  performed through the head and neck.     Sagittal, coronal and 3D reconstruction images were reviewed.      NASCET criteria was used.     No significant carotid stenosis is seen. The bilateral common carotid  and the bilateral internal and external carotid arteries appear patent.  Distal internal carotid arteries appear patent. The bilateral middle and  anterior cerebral arteries appear well-opacified.     Bilateral vertebral arteries and the basilar artery and its branches  appear patent. No aneurysm is seen. No vascular thrombus or occlusion is  seen.     Postcontrast CT of the brain shows no abnormal enhancement.     CT cerebral perfusion images show no evidence of completed infarct or  significant brain at risk.       Impression:       No acute process identified on this CT of the brain with and without  contrast, CT angiography of the head and neck with contrast and CT  cerebral perfusion images.     Radiation dose reduction techniques were utilized, including automated  exposure control and exposure modulation based on body size.     This report was finalized on 7/11/2019 10:44 AM by Dr. Cameron Hutchins M.D.       CT Cerebral Perfusion With & Without Contrast [[id number]] Collected:  07/11/19 1024     Updated:  07/11/19 1047    Narrative:       CT OF THE BRAIN WITH AND WITHOUT CONTRAST, CT ANGIOGRAPHY OF THE HEAD  AND NECK WITH CONTRAST INCLUDING RECONSTRUCTION IMAGES AND CT CEREBRAL  PERFUSION IMAGES     INDICATION: Stroke.         TECHNIQUE: Axial images were obtained through the brain without  intravenous contrast.     FINDINGS: There is mild-to-moderate diffuse atrophy. There is mild  decreased attenuation of the periventricular white matter bilaterally  consistent with mild small vessel white matter ischemic disease.     Following the intravenous contrast injection CT angiography was  performed through the head and neck.     Sagittal, coronal and 3D reconstruction images were reviewed.     NASCET criteria was used.     No significant carotid stenosis is seen. The bilateral common carotid  and the bilateral  internal and external carotid arteries appear patent.  Distal internal carotid arteries appear patent. The bilateral middle and  anterior cerebral arteries appear well-opacified.     Bilateral vertebral arteries and the basilar artery and its branches  appear patent. No aneurysm is seen. No vascular thrombus or occlusion is  seen.     Postcontrast CT of the brain shows no abnormal enhancement.     CT cerebral perfusion images show no evidence of completed infarct or  significant brain at risk.       Impression:       No acute process identified on this CT of the brain with and without  contrast, CT angiography of the head and neck with contrast and CT  cerebral perfusion images.     Radiation dose reduction techniques were utilized, including automated  exposure control and exposure modulation based on body size.     This report was finalized on 7/11/2019 10:44 AM by Dr. Cameron Hutchins M.D.               Assessment/Plan   1.  Syncopal episode of uncertain etiology.  Possibly cardiogenic/vasovagal versus seizure, dehydration, medication side effect.  Her antidepressant, Fetzima, has an 11% incidence of hypotension.  She does not have adequate fluid intake.  Hypernatremia noted.  Because she already pulled her IV out in the ER downstairs, I will not order IV fluids but instead will encourage p.o. fluids.  Stop Fetzima.  EEG ordered.  Already had CT and CTA in ER  2.  Urinary retention with frequency.  Probably overflow incontinence.  She may be getting some side effects from her medications.  Place Colvin catheter for the next 24 hours if she agrees.  Review medications.  I did stop the antidepressant as above.  3.  Elevated MCV.  We will check for B12 and folate deficiency.  Also check for TSH as all are easily correctable  4.  Alzheimer's dementia of early onset.    I discussed the patients findings and my recommendations with patient and  at bedside.  Discussed with nurse.  Record reviewed.    Shaneka  MD Constantine  [facility]  07/11/19  3:53 PM

## 2019-07-11 NOTE — THERAPY EVALUATION
Acute Care - Speech Language Pathology   Swallow Initial Evaluation [facility]     Patient Name: Dee Dee Cox  : 1950  MRN: [id number]  Today's Date: 2019               Admit Date: 2019    Visit Dx:     ICD-10-CM ICD-9-CM   1. Syncope, unspecified syncope type R55 780.2   2. Late onset Alzheimer's disease without behavioral disturbance G30.1 331.0    F02.80 294.10   3. Acute hypernatremia E87.0 276.0   4. Dehydration E86.0 276.51     Patient Active Problem List   Diagnosis   • Syncope     Past Medical History:   Diagnosis Date   • Dementia    • Hyperlipidemia    • Hypertension    • UTI (urinary tract infection)      History reviewed. No pertinent surgical history.     SWALLOW EVALUATION (last 72 hours)      SLP Adult Swallow Evaluation     Row Name 19 1400                   Rehab Evaluation    Document Type  evaluation  -OC        Subjective Information  no complaints  -OC        Patient Observations  alert;cooperative;agree to therapy  -OC        Patient Effort  good  -OC        Symptoms Noted During/After Treatment  none  -OC           General Information    Patient Profile Reviewed  yes  -OC        Pertinent History Of Current Problem  Pt admitted for syncope, AMS. Hx dementia.  -OC        Current Method of Nutrition  NPO  -OC        Precautions/Limitations, Vision  WFL  -OC        Precautions/Limitations, Hearing  WFL  -OC        Prior Level of Function-Communication  cognitive-linguistic impairment  -OC        Prior Level of Function-Swallowing  no diet consistency restrictions  -OC        Plans/Goals Discussed with  patient;spouse/S.O.;agreed upon  -OC        Barriers to Rehab  none identified  -OC        Patient's Goals for Discharge  patient did not state  -OC        Family Goals for Discharge  family did not state  -OC           Pain Assessment    Additional Documentation  Pain Scale: Numbers Pre/Post-Treatment (Group)  -OC           Pain Scale: Numbers Pre/Post-Treatment    Pain  Scale: Numbers, Pretreatment  0/10 - no pain  -OC        Pain Scale: Numbers, Post-Treatment  0/10 - no pain  -OC           Oral Motor and Function    Dentition Assessment  natural, present and adequate  -OC        Secretion Management  WNL/WFL  -OC        Mucosal Quality  moist, healthy  -OC        Volitional Swallow  WFL  -OC        Volitional Cough  WFL  -OC           Oral Musculature and Cranial Nerve Assessment    Oral Motor General Assessment  WFL  -OC           Clinical Swallow Eval    Oral Prep Phase  WFL  -OC        Oral Transit  WFL  -OC        Oral Residue  WFL  -OC        Pharyngeal Phase  no overt signs/symptoms of pharyngeal impairment  -OC        Clinical Swallow Evaluation Summary  No overt s/s aspiration with thin via cup, puree, mech soft, and regular textures.   -OC           Clinical Impression    SLP Swallowing Diagnosis  functional oral phase;functional pharyngeal phase  -OC        Functional Impact  no impact on function  -OC        Rehab Potential/Prognosis, Swallowing  good, to achieve stated therapy goals  -OC        Swallow Criteria for Skilled Therapeutic Interventions Met  baseline status  -OC           Recommendations    Therapy Frequency (Swallow)  evaluation only  -OC        Predicted Duration Therapy Intervention (Days)  until discharge  -OC        SLP Diet Recommendation  regular textures;thin liquids  -OC        Recommended Precautions and Strategies  upright posture during/after eating;small bites of food and sips of liquid  -OC        SLP Rec. for Method of Medication Administration  meds whole;with thin liquids  -OC        Monitor for Signs of Aspiration  yes;notify SLP if any concerns  -OC        Anticipated Dischage Disposition  unknown  -OC          User Key  (r) = Recorded By, (t) = Taken By, (c) = Cosigned By    Initials Name Effective Dates    OC Swathi Gutierrez MA,[facility]-SLP 06/08/18 -           EDUCATION  The patient has been educated in the following areas:   Dysphagia  (Swallowing Impairment).    SLP Recommendation and Plan  SLP Swallowing Diagnosis: functional oral phase, functional pharyngeal phase  SLP Diet Recommendation: regular textures, thin liquids  Recommended Precautions and Strategies: upright posture during/after eating, small bites of food and sips of liquid  SLP Rec. for Method of Medication Administration: meds whole, with thin liquids     Monitor for Signs of Aspiration: yes, notify SLP if any concerns     Swallow Criteria for Skilled Therapeutic Interventions Met: baseline status  Anticipated Dischage Disposition: unknown  Rehab Potential/Prognosis, Swallowing: good, to achieve stated therapy goals  Therapy Frequency (Swallow): evaluation only  Predicted Duration Therapy Intervention (Days): until discharge       Plan of Care Reviewed With: patient  Plan of Care Review  Plan of Care Reviewed With: patient  Outcome Summary: Bedside swallow eval completed. Recommmend regular and thins, meds whole with thin. Recommend upright and slow rate. ST to s/o at this time.          SLP Outcome Measures (last 72 hours)      SLP Outcome Measures     Row Name 07/11/19 1430             SLP Outcome Measures    Outcome Measure Used?  Adult NOMS  -OC         Adult FCM Scores    FCM Chosen  Swallowing  -OC      Swallowing FCM Score  7  -OC        User Key  (r) = Recorded By, (t) = Taken By, (c) = Cosigned By    Initials Name Effective Dates    Swathi Lenz MA,[facility]-SLP 06/08/18 -            Time Calculation:   Time Calculation- SLP     Row Name 07/11/19 1538             Time Calculation- SLP    SLP Start Time  1430  -OC      SLP Received On  07/11/19  -OC        User Key  (r) = Recorded By, (t) = Taken By, (c) = Cosigned By    Initials Name Provider Type    Swathi Lenz MA,CCC-SLP Speech and Language Pathologist          Therapy Charges for Today     Code Description Service Date Service Provider Modifiers Qty    [id number]  ST EVAL ORAL PHARYNG SWALLOW 4 7/11/2019 Card,  DAMIR Echevarria,CCC-SLP GN 1               Swathi Gutierrez MA,[facility]-SLP  7/11/2019

## 2019-07-11 NOTE — CONSULTS
Neurology Consult Note    Consult Date: 7/11/2019    Referring MD: Edgardo Castro MD    Reason for Consult I have been asked to see the patient in neurological consultation to render advice and opinion regarding syncope, collapse, altered mental status    Dee Dee Cox is a 69 y.o. female with past medical history of Alzheimer's dementia, hypertension, hyperlipidemia, no prior cardiac history or history of stroke or TIA.  I obtain history from her son who reported that she was normal upon awakening today.  At baseline she has advanced dementia with memory impairment and frequent disorientation.  Today she seemed at her normal mental status.  She walked into the other room and he heard a thud.  He walked in and found that she had dribbled urine on the floor and had collapsed.  She seemed lethargic and did not respond to him.  911 was called and she was brought to the emergency department.  On arrival for Dr. Castro she was lethargic but follow commands and had a nonfocal exam except for bilateral miotic pupils.  I was called and requested team D imaging including CTA and CT perfusion which were negative.    Past Medical/Surgical Hx:  Past Medical History:   Diagnosis Date   • Dementia    • Hyperlipidemia    • Hypertension    • UTI (urinary tract infection)      History reviewed. No pertinent surgical history.    Medications On Admission  Aricept, Lipitor 20, Xanax 1 mg as needed, Namenda 10 mg, risperidone 0.5 twice daily    Allergies:  Allergies   Allergen Reactions   • Cyproheptadine Unknown (See Comments)     unknown   • Levaquin [Levofloxacin] Unknown (See Comments)     unknown       Social Hx:  Social History     Socioeconomic History   • Marital status:      Spouse name: Not on file   • Number of children: Not on file   • Years of education: Not on file   • Highest education level: Not on file   Tobacco Use   • Smoking status: Never Smoker   Substance and Sexual Activity   • Alcohol use: No     Frequency:  "Never   • Drug use: No   • Sexual activity: Defer       Family Hx:  History reviewed. No pertinent family history.    Review of Systems obtained from son, granddaughter  Constitutional: [No fevers, chills]  Eye: [No recent visual problems, eye discharge]  HEENT: [No ear pain, nasal congestion]  Respiratory: [No shortness of breath, cough]  Cardiovascular: [No Chest pain, palpitations]  Gastrointestinal: [No nausea, vomiting]  Genitourinary: [No hematuria, + incontinence]  Hema/Lymph: [no nosebleeds, history of anticoagulation]  Endocrine: [Negative for excessive urination, heat or cold intolerance]  Musculoskeletal: [No back pain, neck pain]  Integumentary: [No rash, pruritus]  Neurologic: [No weakness, numbness]  Psychiatric: [+ anxiety, no depression]    Exam    /56   Pulse 61   Temp 97.4 °F (36.3 °C) (Oral)   Resp 18   Ht 162.6 cm (64\")   Wt 68 kg (150 lb)   SpO2 100%   BMI 25.75 kg/m²   gen: NAD, vitals reviewed  Eyes: Funduscopy attempted but unable to visualize fundus due to senile miosis  HEENT: no nuchal rigidity  CVS: RRR, S1, S2  MS: Oriented x1, recent/remote memory impaired, lethargic but arouses and answers questions, normal attention/concentration, language intact, no neglect, normal fund of knowledge  CN: visual acuity grossly normal, visual fields full, pupils 1 mm bilaterally, reactive, EOMI with saccadic intrusions, facial sensation equal, no facial droop, hearing symmetric, palate elevates symmetrically, shoulder shrug equal, tongue midline  Motor: 5/5 throughout upper and lower extremities, normal tone  Sensation: intact to vibration and temperature throughout  Reflexes: 2+ throughout upper and lower extremities, downgoing plantars  Coordination: no dysmetria with finger to nose bilaterally  Gait: Deferred due to syncopal spell, disorientation, lethargy    DATA:    Lab Results   Component Value Date    GLUCOSE 111 (H) 07/11/2019    CALCIUM 8.8 07/11/2019     (H) 07/11/2019    K " 3.9 07/11/2019    CO2 28.8 07/11/2019     (H) 07/11/2019    BUN 16 07/11/2019    CREATININE 0.95 07/11/2019    EGFRIFNONA 58 (L) 07/11/2019    BCR 16.8 07/11/2019    ANIONGAP 10.2 07/11/2019     Lab Results   Component Value Date    WBC 6.39 07/11/2019    HGB 13.2 07/11/2019    HCT 42.0 07/11/2019    MCV 97.9 (H) 07/11/2019     07/11/2019     No results found for: CHOL  No results found for: HDL  No results found for: LDL  No results found for: TRIG  No results found for: HGBA1C  Lab Results   Component Value Date    INR 1.19 (H) 07/11/2019    PROTIME 14.8 (H) 07/11/2019       Lab review: Sodium 149, GFR 58, CBC normal, urinalysis normal    Imaging review: CT head, CTA, CT perfusion personally reviewed and discussed with the reading radiologist Dr. Hutchins, all appear normal with no stroke or ICH, no significant atherosclerosis in the extracranial or intracranial circulation, normal CT perfusion    Impression:  1) syncope  2) urinary incontinence  3) acute encephalopathy  4) Alzheimer's dementia, late onset with behavioral disturbance    Comment: 69-year-old female with history of Alzheimer's dementia, otherwise healthy, presented with syncope and altered mental status.  Differential diagnosis includes syncope with dementia related disorientation following the spell versus seizure.  Stroke/TIA felt to be less likely given nonfocal exam.  Recommend metabolic/infectious work-up, will also check EEG given associated urinary incontinence and persistent encephalopathy.    PLAN:  1.  Routine EEG    Will follow

## 2019-07-11 NOTE — ED NOTES
Pt's  approached the nurses station stating that the patient had gotten out of bed and that she had pulled her monitors off, and there was blood all over. Entered the room to find the pt standing beside her bed using a blanket to wipe the floor. Pt had disconnected the purewick from suction and removed her monitors. Pt had pulled out her IV and was bleeding from the site. Site covered in gauze. Pt stated that she peed in the floor. Bed and floor cleaned. Pt's gown changed and placed back in bed. All monitors and pure wick re-applied. Bed alarm placed on pt's bed.  at beside.      Diana Kelly, RN  07/11/19 3173

## 2019-07-12 VITALS
HEIGHT: 64 IN | WEIGHT: 150 LBS | DIASTOLIC BLOOD PRESSURE: 90 MMHG | HEART RATE: 72 BPM | TEMPERATURE: 98.5 F | RESPIRATION RATE: 18 BRPM | SYSTOLIC BLOOD PRESSURE: 151 MMHG | OXYGEN SATURATION: 95 % | BODY MASS INDEX: 25.61 KG/M2

## 2019-07-12 PROBLEM — T50.905A ADVERSE EFFECT OF DRUG OR MEDICAMENT: Status: ACTIVE | Noted: 2019-07-12

## 2019-07-12 LAB
ANION GAP SERPL CALCULATED.3IONS-SCNC: 13.7 MMOL/L (ref 5–15)
BUN BLD-MCNC: 12 MG/DL (ref 8–23)
BUN/CREAT SERPL: 11.9 (ref 7–25)
CALCIUM SPEC-SCNC: 9.6 MG/DL (ref 8.6–10.5)
CHLORIDE SERPL-SCNC: 102 MMOL/L (ref 98–107)
CO2 SERPL-SCNC: 23.3 MMOL/L (ref 22–29)
CREAT BLD-MCNC: 1.01 MG/DL (ref 0.57–1)
DEPRECATED RDW RBC AUTO: 48.1 FL (ref 37–54)
ERYTHROCYTE [DISTWIDTH] IN BLOOD BY AUTOMATED COUNT: 13.6 % (ref 12.3–15.4)
FOLATE SERPL-MCNC: >20 NG/ML (ref 4.78–24.2)
GFR SERPL CREATININE-BSD FRML MDRD: 54 ML/MIN/1.73
GLUCOSE BLD-MCNC: 103 MG/DL (ref 65–99)
HCT VFR BLD AUTO: 46.6 % (ref 34–46.6)
HGB BLD-MCNC: 15 G/DL (ref 12–15.9)
MCH RBC QN AUTO: 30.7 PG (ref 26.6–33)
MCHC RBC AUTO-ENTMCNC: 32.2 G/DL (ref 31.5–35.7)
MCV RBC AUTO: 95.3 FL (ref 79–97)
PLATELET # BLD AUTO: 368 10*3/MM3 (ref 140–450)
PMV BLD AUTO: 10.5 FL (ref 6–12)
POTASSIUM BLD-SCNC: 3.7 MMOL/L (ref 3.5–5.2)
RBC # BLD AUTO: 4.89 10*6/MM3 (ref 3.77–5.28)
SODIUM BLD-SCNC: 139 MMOL/L (ref 136–145)
TSH SERPL DL<=0.05 MIU/L-ACNC: 1.77 MIU/ML (ref 0.27–4.2)
VIT B12 BLD-MCNC: 1601 PG/ML (ref 211–946)
WBC NRBC COR # BLD: 11.37 10*3/MM3 (ref 3.4–10.8)

## 2019-07-12 PROCEDURE — G0378 HOSPITAL OBSERVATION PER HR: HCPCS

## 2019-07-12 PROCEDURE — 82746 ASSAY OF FOLIC ACID SERUM: CPT | Performed by: INTERNAL MEDICINE

## 2019-07-12 PROCEDURE — 84443 ASSAY THYROID STIM HORMONE: CPT | Performed by: INTERNAL MEDICINE

## 2019-07-12 PROCEDURE — 82607 VITAMIN B-12: CPT | Performed by: INTERNAL MEDICINE

## 2019-07-12 PROCEDURE — 80048 BASIC METABOLIC PNL TOTAL CA: CPT | Performed by: INTERNAL MEDICINE

## 2019-07-12 PROCEDURE — 85027 COMPLETE CBC AUTOMATED: CPT | Performed by: INTERNAL MEDICINE

## 2019-07-12 RX ADMIN — BUSPIRONE HYDROCHLORIDE 15 MG: 15 TABLET ORAL at 09:10

## 2019-07-12 RX ADMIN — RISPERIDONE 0.5 MG: 0.5 TABLET, FILM COATED ORAL at 09:10

## 2019-07-12 RX ADMIN — ATORVASTATIN CALCIUM 20 MG: 20 TABLET, FILM COATED ORAL at 09:10

## 2019-07-12 RX ADMIN — PROPRANOLOL HYDROCHLORIDE 60 MG: 40 TABLET ORAL at 09:10

## 2019-07-12 RX ADMIN — MEMANTINE HYDROCHLORIDE 10 MG: 10 TABLET, FILM COATED ORAL at 09:10

## 2019-07-12 RX ADMIN — Medication 1 TABLET: at 09:10

## 2019-07-12 NOTE — PLAN OF CARE
Problem: Fall Risk (Adult)  Goal: Identify Related Risk Factors and Signs and Symptoms  Outcome: Outcome(s) achieved Date Met: 07/12/19    Goal: Absence of Fall  Outcome: Ongoing (interventions implemented as appropriate)      Problem: Patient Care Overview  Goal: Plan of Care Review  Outcome: Ongoing (interventions implemented as appropriate)   07/12/19 0511   Coping/Psychosocial   Plan of Care Reviewed With patient;spouse   OTHER   Outcome Summary Confused, climbing out of bed most of the night,  refused zyprexa that was ordered, also refused to place restraints, pulled michael catheter out this morning,  in room, patient up and down with spouse in room, vss, will continue to monitor   Plan of Care Review   Progress no change     Goal: Individualization and Mutuality  Outcome: Ongoing (interventions implemented as appropriate)    Goal: Discharge Needs Assessment  Outcome: Ongoing (interventions implemented as appropriate)      Problem: Syncope (Adult)  Goal: Identify Related Risk Factors and Signs and Symptoms  Outcome: Outcome(s) achieved Date Met: 07/12/19    Goal: Physical Safety/Health Maintenance  Outcome: Ongoing (interventions implemented as appropriate)    Goal: Optimal Emotional/Functional [place]  Outcome: Ongoing (interventions implemented as appropriate)

## 2019-07-12 NOTE — DISCHARGE SUMMARY
Date of Admission: 7/11/2019  Date of Discharge:  7/12/2019  Primary Care Physician: Zee Daley MD     Discharge Diagnosis:  Active Hospital Problems    Diagnosis  POA   • **Syncope [R55]  Yes   • Adverse effect of drug or medicament [T50.905A]  Unknown   • Hypernatremia [E87.0]  Unknown   • Early onset Alzheimer's dementia [G30.0, F02.80]  Unknown   • Acute urinary retention [R33.8]  Unknown   • Overflow incontinence [N39.490]  Unknown      Resolved Hospital Problems   No resolved problems to display.       Presenting Problem/History of Present Illness:  Dehydration [E86.0]  Acute hypernatremia [E87.0]  Late onset Alzheimer's disease without behavioral disturbance [G30.1, F02.80]  Syncope, unspecified syncope type [R55]  Syncope, unspecified syncope type [R55]     Hospital Course:  The patient is a 69 y.o. woman admitted after having been found on the floor sitting in urine.  Patient has fairly advanced dementia.  She was seen by neurology.  She was not thought to have had a stroke or seizure.  She was dehydrated on admission.  Fluid intake at home is inadequate.  In addition she may be having side effects from Fetzima (orthostasis), risperdal (urinary retention) and Aricept (urinary frequency, agitation).  Those 3 medications were stopped.  Post void residual was 500 cc.  Colvin catheter was placed; however, patient removed it.  Patient at times was agitated and combative.  Her  was able to calm her.  I discussed her case with neurology.  No further inpatient testing indicated at this time.  She was discharged home with outpatient follow-up.      Stable condition; poor prognosis    Exam Today: Alert though confused.  Agitated at times.   at bedside.  Vital signs noted.  No distress.  Heart is regular without murmur.  Lungs clear.  No facial asymmetry.  Extremities no edema abdomen nondistended    Procedures Performed:  Ct Angiogram Head With & Without Contrast    Result Date: 7/11/2019  No  acute process identified on this CT of the brain with and without contrast, CT angiography of the head and neck with contrast and CT cerebral perfusion images.  Radiation dose reduction techniques were utilized, including automated exposure control and exposure modulation based on body size.  This report was finalized on 7/11/2019 10:44 AM by Dr. Cameron Hutchins M.D.      Ct Angiogram Neck With & Without Contrast    Result Date: 7/11/2019  No acute process identified on this CT of the brain with and without contrast, CT angiography of the head and neck with contrast and CT cerebral perfusion images.  Radiation dose reduction techniques were utilized, including automated exposure control and exposure modulation based on body size.  This report was finalized on 7/11/2019 10:44 AM by Dr. Cameron Hutchins M.D.      Ct Cerebral Perfusion With & Without Contrast    Result Date: 7/11/2019  No acute process identified on this CT of the brain with and without contrast, CT angiography of the head and neck with contrast and CT cerebral perfusion images.  Radiation dose reduction techniques were utilized, including automated exposure control and exposure modulation based on body size.  This report was finalized on 7/11/2019 10:44 AM by Dr. Cameron Hutchins M.D.           Labs:  Lab Results   Component Value Date    WBC 11.37 (H) 07/12/2019    HGB 15.0 07/12/2019    HCT 46.6 07/12/2019     07/12/2019     Lab Results   Component Value Date     07/12/2019    K 3.7 07/12/2019     07/12/2019    CO2 23.3 07/12/2019    BUN 12 07/12/2019    CREATININE 1.01 (H) 07/12/2019    GLUCOSE 103 (H) 07/12/2019     TSH 1.8.  Folate greater than 20.  B12 1601      Consults:   Dr. Rodas    Discharge Disposition:  Home or Self Care    Discharge Medications:     Discharge Medications      Continue These Medications      Instructions Start Date   ALPRAZolam 1 MG tablet  Commonly known as:  XANAX   1 mg, Oral, 2 Times Daily  PRN      atorvastatin 20 MG tablet  Commonly known as:  LIPITOR   20 mg, Oral, Daily      busPIRone 15 MG tablet  Commonly known as:  BUSPAR   15 mg, Oral, 3 Times Daily      folic acid-vit B6-vit B12 0.8-10-0.115 MG tablet tablet  Commonly known as:  FOLTABS   Oral, Daily      memantine 10 MG tablet  Commonly known as:  NAMENDA   10 mg, Oral, 2 Times Daily      Multiple Vitamin tablet   1 tablet, Oral, Daily      propranolol 60 MG tablet  Commonly known as:  INDERAL   60 mg, Oral, 3 Times Daily         Stop These Medications    acyclovir 200 MG capsule  Commonly known as:  ZOVIRAX     calcium citrate-vitamin d 200-250 MG-UNIT tablet tablet  Commonly known as:  CITRACAL     desoximetasone 0.25 % cream  Commonly known as:  TOPICORT     donepezil 10 MG tablet  Commonly known as:  ARICEPT     FETZIMA 40 MG capsule sustained-release 24 hr  Generic drug:  Levomilnacipran HCl ER     magnesium gluconate 500 MG tablet  Commonly known as:  MAGONATE     risperiDONE 0.5 MG tablet  Commonly known as:  risperDAL     SELENICAPS-200 PO     triamcinolone 0.1 % ointment  Commonly known as:  KENALOG            Discharge Diet:   Diet Instructions     Diet: Regular      Discharge Diet:  Regular          Activity at Discharge:   Activity Instructions     Activity as Tolerated            Follow-up Appointments:  No future appointments.  Follow-up Information     Zee Daley MD Follow up in 1 week(s).    Specialty:  Internal Medicine  Why:  dementia; urinary retention and frequency  Contact information:  [address] [address]  [phone]                     Test Results Pending at Discharge: None       Shaneka Fitch MD  07/12/19  11:17 AM    Time Spent on Discharge Activities: 35 minutes.  Discussed at length with patient and  at bedside.  Discussed with nurse and with Dr. Rodas

## 2019-07-12 NOTE — PROGRESS NOTES
Discussed with Dr. Fitch. Event felt to be related to orthostatic syncope related to medications. No need for EEG or further neuro workup.

## 2019-07-12 NOTE — DISCHARGE INSTRUCTIONS
DO NOT CONTINUE DONEPEZIL/ARICEPT- The Neurologist feels this is increasing agitation and contributing to increase frequency of urination

## 2019-07-13 ENCOUNTER — READMISSION MANAGEMENT (OUTPATIENT)
Dept: CALL CENTER | Facility: HOSPITAL | Age: 69
End: 2019-07-13

## 2019-07-13 NOTE — OUTREACH NOTE
Prep Survey      Responses   Facility patient discharged from?  [facility]   Is patient eligible?  Yes   Discharge diagnosis  Syncope,     Adverse effect of drug or medicament,      Acute urinary retention    Does the patient have one of the following disease processes/diagnoses(primary or secondary)?  Other   Does the patient have Home health ordered?  No   Is there a DME ordered?  No   Medication alerts for this patient  stopping several meds   General alerts for this patient  Late onset Alzheimer's disease    Prep survey completed?  Yes          Quita Newman RN

## 2019-07-14 ENCOUNTER — READMISSION MANAGEMENT (OUTPATIENT)
Dept: CALL CENTER | Facility: HOSPITAL | Age: 69
End: 2019-07-14

## 2019-07-15 LAB — CREAT BLDA-MCNC: 0.9 MG/DL (ref 0.6–1.3)

## 2019-08-15 ENCOUNTER — HOSPITAL ENCOUNTER (INPATIENT)
Facility: HOSPITAL | Age: 69
LOS: 3 days | Discharge: HOME | End: 2019-08-18
Attending: EMERGENCY MEDICINE | Admitting: INTERNAL MEDICINE

## 2019-08-15 DIAGNOSIS — I95.9 HYPOTENSION, UNSPECIFIED HYPOTENSION TYPE: ICD-10-CM

## 2019-08-15 DIAGNOSIS — F02.80 ALZHEIMER'S DEMENTIA WITHOUT BEHAVIORAL DISTURBANCE, UNSPECIFIED TIMING OF DEMENTIA ONSET: ICD-10-CM

## 2019-08-15 DIAGNOSIS — T50.901A ACCIDENTAL OVERDOSE, INITIAL ENCOUNTER: Primary | ICD-10-CM

## 2019-08-15 DIAGNOSIS — G30.9 ALZHEIMER'S DEMENTIA WITHOUT BEHAVIORAL DISTURBANCE, UNSPECIFIED TIMING OF DEMENTIA ONSET: ICD-10-CM

## 2019-08-15 LAB
ALBUMIN SERPL-MCNC: 4.3 G/DL (ref 3.5–5.2)
ALBUMIN/GLOB SERPL: 1.7 G/DL
ALP SERPL-CCNC: 110 U/L (ref 39–117)
ALT SERPL W P-5'-P-CCNC: 22 U/L (ref 1–33)
ANION GAP SERPL CALCULATED.3IONS-SCNC: 12.6 MMOL/L (ref 5–15)
APAP SERPL-MCNC: <5 MCG/ML (ref 10–30)
AST SERPL-CCNC: 21 U/L (ref 1–32)
BASOPHILS # BLD AUTO: 0.04 10*3/MM3 (ref 0–0.2)
BASOPHILS NFR BLD AUTO: 0.6 % (ref 0–1.5)
BILIRUB SERPL-MCNC: 0.4 MG/DL (ref 0.2–1.2)
BUN BLD-MCNC: 18 MG/DL (ref 8–23)
BUN/CREAT SERPL: 18 (ref 7–25)
CALCIUM SPEC-SCNC: 9.4 MG/DL (ref 8.6–10.5)
CHLORIDE SERPL-SCNC: 108 MMOL/L (ref 98–107)
CO2 SERPL-SCNC: 24.4 MMOL/L (ref 22–29)
CREAT BLD-MCNC: 1 MG/DL (ref 0.57–1)
DEPRECATED RDW RBC AUTO: 51.3 FL (ref 37–54)
EOSINOPHIL # BLD AUTO: 0.15 10*3/MM3 (ref 0–0.4)
EOSINOPHIL NFR BLD AUTO: 2.1 % (ref 0.3–6.2)
ERYTHROCYTE [DISTWIDTH] IN BLOOD BY AUTOMATED COUNT: 14 % (ref 12.3–15.4)
ETHANOL BLD-MCNC: <10 MG/DL (ref 0–10)
ETHANOL UR QL: <0.01 %
GFR SERPL CREATININE-BSD FRML MDRD: 55 ML/MIN/1.73
GLOBULIN UR ELPH-MCNC: 2.6 GM/DL
GLUCOSE BLD-MCNC: 95 MG/DL (ref 65–99)
HCT VFR BLD AUTO: 41 % (ref 34–46.6)
HGB BLD-MCNC: 12.7 G/DL (ref 12–15.9)
IMM GRANULOCYTES # BLD AUTO: 0.02 10*3/MM3 (ref 0–0.05)
IMM GRANULOCYTES NFR BLD AUTO: 0.3 % (ref 0–0.5)
LYMPHOCYTES # BLD AUTO: 2.53 10*3/MM3 (ref 0.7–3.1)
LYMPHOCYTES NFR BLD AUTO: 35.8 % (ref 19.6–45.3)
MCH RBC QN AUTO: 30.5 PG (ref 26.6–33)
MCHC RBC AUTO-ENTMCNC: 31 G/DL (ref 31.5–35.7)
MCV RBC AUTO: 98.3 FL (ref 79–97)
MONOCYTES # BLD AUTO: 0.81 10*3/MM3 (ref 0.1–0.9)
MONOCYTES NFR BLD AUTO: 11.5 % (ref 5–12)
NEUTROPHILS # BLD AUTO: 3.51 10*3/MM3 (ref 1.7–7)
NEUTROPHILS NFR BLD AUTO: 49.7 % (ref 42.7–76)
NRBC BLD AUTO-RTO: 0.1 /100 WBC (ref 0–0.2)
PLATELET # BLD AUTO: 281 10*3/MM3 (ref 140–450)
PMV BLD AUTO: 9.9 FL (ref 6–12)
POTASSIUM BLD-SCNC: 4.3 MMOL/L (ref 3.5–5.2)
PROT SERPL-MCNC: 6.9 G/DL (ref 6–8.5)
RBC # BLD AUTO: 4.17 10*6/MM3 (ref 3.77–5.28)
SALICYLATES SERPL-MCNC: <0.3 MG/DL
SODIUM BLD-SCNC: 145 MMOL/L (ref 136–145)
WBC NRBC COR # BLD: 7.06 10*3/MM3 (ref 3.4–10.8)

## 2019-08-15 PROCEDURE — 93010 ELECTROCARDIOGRAM REPORT: CPT | Performed by: INTERNAL MEDICINE

## 2019-08-15 PROCEDURE — 80307 DRUG TEST PRSMV CHEM ANLYZR: CPT | Performed by: EMERGENCY MEDICINE

## 2019-08-15 PROCEDURE — 85025 COMPLETE CBC W/AUTO DIFF WBC: CPT | Performed by: EMERGENCY MEDICINE

## 2019-08-15 PROCEDURE — 99285 EMERGENCY DEPT VISIT HI MDM: CPT

## 2019-08-15 PROCEDURE — 93005 ELECTROCARDIOGRAM TRACING: CPT | Performed by: EMERGENCY MEDICINE

## 2019-08-15 PROCEDURE — 80053 COMPREHEN METABOLIC PANEL: CPT | Performed by: EMERGENCY MEDICINE

## 2019-08-15 RX ORDER — SODIUM CHLORIDE 0.9 % (FLUSH) 0.9 %
10 SYRINGE (ML) INJECTION AS NEEDED
Status: DISCONTINUED | OUTPATIENT
Start: 2019-08-15 | End: 2019-08-18 | Stop reason: HOSPADM

## 2019-08-15 RX ADMIN — ATROPINE SULFATE 0.5 MG: 0.1 INJECTION PARENTERAL at 22:48

## 2019-08-15 RX ADMIN — SODIUM CHLORIDE 1000 ML: 9 INJECTION, SOLUTION INTRAVENOUS at 21:10

## 2019-08-15 RX ADMIN — SODIUM CHLORIDE, POTASSIUM CHLORIDE, SODIUM LACTATE AND CALCIUM CHLORIDE 1000 ML: 600; 310; 30; 20 INJECTION, SOLUTION INTRAVENOUS at 22:48

## 2019-08-16 LAB
ALBUMIN SERPL-MCNC: 3.9 G/DL (ref 3.5–5.2)
ALBUMIN/GLOB SERPL: 1.6 G/DL
ALP SERPL-CCNC: 93 U/L (ref 39–117)
ALT SERPL W P-5'-P-CCNC: 21 U/L (ref 1–33)
ANION GAP SERPL CALCULATED.3IONS-SCNC: 12.7 MMOL/L (ref 5–15)
ARTERIAL PATENCY WRIST A: POSITIVE
AST SERPL-CCNC: 23 U/L (ref 1–32)
ATMOSPHERIC PRESS: 752.6 MMHG
BASE EXCESS BLDA CALC-SCNC: -2.2 MMOL/L (ref 0–2)
BASOPHILS # BLD AUTO: 0.03 10*3/MM3 (ref 0–0.2)
BASOPHILS NFR BLD AUTO: 0.4 % (ref 0–1.5)
BDY SITE: ABNORMAL
BILIRUB SERPL-MCNC: 0.5 MG/DL (ref 0.2–1.2)
BUN BLD-MCNC: 16 MG/DL (ref 8–23)
BUN/CREAT SERPL: 21.1 (ref 7–25)
CALCIUM SPEC-SCNC: 8.8 MG/DL (ref 8.6–10.5)
CHLORIDE SERPL-SCNC: 109 MMOL/L (ref 98–107)
CO2 SERPL-SCNC: 22.3 MMOL/L (ref 22–29)
CREAT BLD-MCNC: 0.76 MG/DL (ref 0.57–1)
DEPRECATED RDW RBC AUTO: 51.6 FL (ref 37–54)
EOSINOPHIL # BLD AUTO: 0.12 10*3/MM3 (ref 0–0.4)
EOSINOPHIL NFR BLD AUTO: 1.5 % (ref 0.3–6.2)
ERYTHROCYTE [DISTWIDTH] IN BLOOD BY AUTOMATED COUNT: 14.2 % (ref 12.3–15.4)
GFR SERPL CREATININE-BSD FRML MDRD: 75 ML/MIN/1.73
GLOBULIN UR ELPH-MCNC: 2.4 GM/DL
GLUCOSE BLD-MCNC: 92 MG/DL (ref 65–99)
GLUCOSE BLDC GLUCOMTR-MCNC: 102 MG/DL (ref 70–130)
GLUCOSE BLDC GLUCOMTR-MCNC: 65 MG/DL (ref 70–130)
GLUCOSE BLDC GLUCOMTR-MCNC: 83 MG/DL (ref 70–130)
GLUCOSE BLDC GLUCOMTR-MCNC: 90 MG/DL (ref 70–130)
GLUCOSE BLDC GLUCOMTR-MCNC: 95 MG/DL (ref 70–130)
GLUCOSE BLDC GLUCOMTR-MCNC: 96 MG/DL (ref 70–130)
HCO3 BLDA-SCNC: 22 MMOL/L (ref 22–28)
HCT VFR BLD AUTO: 41.9 % (ref 34–46.6)
HGB BLD-MCNC: 13 G/DL (ref 12–15.9)
IMM GRANULOCYTES # BLD AUTO: 0.04 10*3/MM3 (ref 0–0.05)
IMM GRANULOCYTES NFR BLD AUTO: 0.5 % (ref 0–0.5)
LYMPHOCYTES # BLD AUTO: 1.94 10*3/MM3 (ref 0.7–3.1)
LYMPHOCYTES NFR BLD AUTO: 24.3 % (ref 19.6–45.3)
MCH RBC QN AUTO: 30.4 PG (ref 26.6–33)
MCHC RBC AUTO-ENTMCNC: 31 G/DL (ref 31.5–35.7)
MCV RBC AUTO: 98.1 FL (ref 79–97)
MODALITY: ABNORMAL
MONOCYTES # BLD AUTO: 0.75 10*3/MM3 (ref 0.1–0.9)
MONOCYTES NFR BLD AUTO: 9.4 % (ref 5–12)
NEUTROPHILS # BLD AUTO: 5.1 10*3/MM3 (ref 1.7–7)
NEUTROPHILS NFR BLD AUTO: 63.9 % (ref 42.7–76)
NRBC BLD AUTO-RTO: 0 /100 WBC (ref 0–0.2)
PCO2 BLDA: 34.8 MM HG (ref 35–45)
PH BLDA: 7.41 PH UNITS (ref 7.35–7.45)
PLATELET # BLD AUTO: 288 10*3/MM3 (ref 140–450)
PMV BLD AUTO: 10.1 FL (ref 6–12)
PO2 BLDA: 89.5 MM HG (ref 80–100)
POTASSIUM BLD-SCNC: 4 MMOL/L (ref 3.5–5.2)
PROT SERPL-MCNC: 6.3 G/DL (ref 6–8.5)
RBC # BLD AUTO: 4.27 10*6/MM3 (ref 3.77–5.28)
SAO2 % BLDCOA: 97.1 % (ref 92–99)
SODIUM BLD-SCNC: 144 MMOL/L (ref 136–145)
TOTAL RATE: 12 BREATHS/MINUTE
WBC NRBC COR # BLD: 7.98 10*3/MM3 (ref 3.4–10.8)

## 2019-08-16 PROCEDURE — 25010000002 ENOXAPARIN PER 10 MG: Performed by: INTERNAL MEDICINE

## 2019-08-16 PROCEDURE — 36600 WITHDRAWAL OF ARTERIAL BLOOD: CPT

## 2019-08-16 PROCEDURE — 85025 COMPLETE CBC W/AUTO DIFF WBC: CPT | Performed by: INTERNAL MEDICINE

## 2019-08-16 PROCEDURE — 82962 GLUCOSE BLOOD TEST: CPT

## 2019-08-16 PROCEDURE — 97162 PT EVAL MOD COMPLEX 30 MIN: CPT

## 2019-08-16 PROCEDURE — 80053 COMPREHEN METABOLIC PANEL: CPT | Performed by: INTERNAL MEDICINE

## 2019-08-16 PROCEDURE — 82803 BLOOD GASES ANY COMBINATION: CPT

## 2019-08-16 PROCEDURE — 97110 THERAPEUTIC EXERCISES: CPT

## 2019-08-16 RX ORDER — ONDANSETRON 2 MG/ML
4 INJECTION INTRAMUSCULAR; INTRAVENOUS EVERY 6 HOURS PRN
Status: DISCONTINUED | OUTPATIENT
Start: 2019-08-16 | End: 2019-08-18 | Stop reason: HOSPADM

## 2019-08-16 RX ORDER — DEXTROSE MONOHYDRATE 25 G/50ML
25 INJECTION, SOLUTION INTRAVENOUS
Status: DISCONTINUED | OUTPATIENT
Start: 2019-08-16 | End: 2019-08-18 | Stop reason: HOSPADM

## 2019-08-16 RX ORDER — ALPRAZOLAM 0.5 MG/1
0.5 TABLET ORAL EVERY 8 HOURS PRN
Status: DISCONTINUED | OUTPATIENT
Start: 2019-08-16 | End: 2019-08-18 | Stop reason: HOSPADM

## 2019-08-16 RX ORDER — POTASSIUM CHLORIDE 7.45 MG/ML
10 INJECTION INTRAVENOUS
Status: DISCONTINUED | OUTPATIENT
Start: 2019-08-16 | End: 2019-08-18 | Stop reason: HOSPADM

## 2019-08-16 RX ORDER — ALPRAZOLAM 0.5 MG/1
0.5 TABLET ORAL EVERY 8 HOURS PRN
Status: DISCONTINUED | OUTPATIENT
Start: 2019-08-16 | End: 2019-08-16

## 2019-08-16 RX ORDER — MAGNESIUM SULFATE HEPTAHYDRATE 40 MG/ML
2 INJECTION, SOLUTION INTRAVENOUS AS NEEDED
Status: DISCONTINUED | OUTPATIENT
Start: 2019-08-16 | End: 2019-08-18 | Stop reason: HOSPADM

## 2019-08-16 RX ORDER — NICOTINE POLACRILEX 4 MG
15 LOZENGE BUCCAL
Status: DISCONTINUED | OUTPATIENT
Start: 2019-08-16 | End: 2019-08-18 | Stop reason: HOSPADM

## 2019-08-16 RX ORDER — MAGNESIUM SULFATE HEPTAHYDRATE 40 MG/ML
4 INJECTION, SOLUTION INTRAVENOUS AS NEEDED
Status: DISCONTINUED | OUTPATIENT
Start: 2019-08-16 | End: 2019-08-18 | Stop reason: HOSPADM

## 2019-08-16 RX ORDER — SODIUM CHLORIDE 0.9 % (FLUSH) 0.9 %
3 SYRINGE (ML) INJECTION EVERY 12 HOURS SCHEDULED
Status: DISCONTINUED | OUTPATIENT
Start: 2019-08-16 | End: 2019-08-18 | Stop reason: HOSPADM

## 2019-08-16 RX ORDER — ACETAMINOPHEN 325 MG/1
650 TABLET ORAL EVERY 4 HOURS PRN
Status: DISCONTINUED | OUTPATIENT
Start: 2019-08-16 | End: 2019-08-18 | Stop reason: HOSPADM

## 2019-08-16 RX ORDER — SENNA AND DOCUSATE SODIUM 50; 8.6 MG/1; MG/1
2 TABLET, FILM COATED ORAL NIGHTLY
Status: DISCONTINUED | OUTPATIENT
Start: 2019-08-16 | End: 2019-08-18 | Stop reason: HOSPADM

## 2019-08-16 RX ORDER — SODIUM CHLORIDE 0.9 % (FLUSH) 0.9 %
3-10 SYRINGE (ML) INJECTION AS NEEDED
Status: DISCONTINUED | OUTPATIENT
Start: 2019-08-16 | End: 2019-08-18 | Stop reason: HOSPADM

## 2019-08-16 RX ORDER — ACETAMINOPHEN 650 MG/1
650 SUPPOSITORY RECTAL EVERY 4 HOURS PRN
Status: DISCONTINUED | OUTPATIENT
Start: 2019-08-16 | End: 2019-08-18 | Stop reason: HOSPADM

## 2019-08-16 RX ORDER — DEXTROSE AND SODIUM CHLORIDE 5; .45 G/100ML; G/100ML
75 INJECTION, SOLUTION INTRAVENOUS CONTINUOUS
Status: DISCONTINUED | OUTPATIENT
Start: 2019-08-16 | End: 2019-08-18 | Stop reason: HOSPADM

## 2019-08-16 RX ORDER — POTASSIUM CHLORIDE 1.5 G/1.77G
40 POWDER, FOR SOLUTION ORAL AS NEEDED
Status: DISCONTINUED | OUTPATIENT
Start: 2019-08-16 | End: 2019-08-18 | Stop reason: HOSPADM

## 2019-08-16 RX ORDER — POTASSIUM CHLORIDE 750 MG/1
40 CAPSULE, EXTENDED RELEASE ORAL AS NEEDED
Status: DISCONTINUED | OUTPATIENT
Start: 2019-08-16 | End: 2019-08-18 | Stop reason: HOSPADM

## 2019-08-16 RX ORDER — IPRATROPIUM BROMIDE AND ALBUTEROL SULFATE 2.5; .5 MG/3ML; MG/3ML
3 SOLUTION RESPIRATORY (INHALATION)
Status: DISCONTINUED | OUTPATIENT
Start: 2019-08-16 | End: 2019-08-18 | Stop reason: HOSPADM

## 2019-08-16 RX ADMIN — DEXTROSE AND SODIUM CHLORIDE 75 ML/HR: 5; 450 INJECTION, SOLUTION INTRAVENOUS at 14:27

## 2019-08-16 RX ADMIN — SODIUM CHLORIDE, PRESERVATIVE FREE 3 ML: 5 INJECTION INTRAVENOUS at 20:56

## 2019-08-16 RX ADMIN — SENNOSIDES AND DOCUSATE SODIUM 2 TABLET: 8.6; 5 TABLET ORAL at 20:57

## 2019-08-16 RX ADMIN — ENOXAPARIN SODIUM 40 MG: 40 INJECTION SUBCUTANEOUS at 08:26

## 2019-08-16 RX ADMIN — DEXTROSE AND SODIUM CHLORIDE 75 ML/HR: 5; 450 INJECTION, SOLUTION INTRAVENOUS at 01:02

## 2019-08-16 RX ADMIN — ALPRAZOLAM 0.5 MG: 0.5 TABLET ORAL at 18:55

## 2019-08-16 RX ADMIN — SODIUM CHLORIDE, PRESERVATIVE FREE 3 ML: 5 INJECTION INTRAVENOUS at 01:04

## 2019-08-16 RX ADMIN — SODIUM CHLORIDE, PRESERVATIVE FREE 3 ML: 5 INJECTION INTRAVENOUS at 12:35

## 2019-08-16 NOTE — THERAPY EVALUATION
Patient Name: Dee Dee Cox  : 1950    MRN: [id number]                              Today's Date: 2019       Admit Date: 8/15/2019    Visit Dx:     ICD-10-CM ICD-9-CM   1. Accidental overdose, initial encounter T50.901A 977.9     E858.9   2. Hypotension, unspecified hypotension type I95.9 458.9   3. Alzheimer's dementia without behavioral disturbance, unspecified timing of dementia onset G30.9 331.0    F02.80 294.10     Patient Active Problem List   Diagnosis   • Syncope   • Hypernatremia   • Early onset Alzheimer's dementia   • Acute urinary retention   • Overflow incontinence   • Adverse effect of drug or medicament   • Accidental overdose     Past Medical History:   Diagnosis Date   • Dementia    • Hyperlipidemia    • Hypertension    • UTI (urinary tract infection)      History reviewed. No pertinent surgical history.  General Information     Row Name 19 1549          PT Evaluation Time/Intention    Document Type  evaluation  -EM     Mode of Treatment  individual therapy;physical therapy  -EM     Row Name 19 1549          General Information    Patient Profile Reviewed?  yes  -EM     Prior Level of Function  independent:  -EM     Barriers to Rehab  cognitive status  -EM     Row Name 19 1549 19 1521       Relationship/Environment    Lives With  spouse  -EM  spouse  -MS    Family Caregiver if Needed  --  spouse  -MS    Row Name 19 1549 19 1521       Resource/Environmental Concerns    Current Living Arrangements  home/apartment/condo  -EM  home/apartment/condo  -MS    Resource/Environmental Concerns  --  none  -MS    Row Name 19 1549          Cognitive Assessment/Intervention- PT/OT    Orientation Status (Cognition)  oriented to;person  -EM     Row Name 19 1549          Safety Issues, Functional Mobility    Safety Issues Affecting Function (Mobility)  judgment;problem solving  -EM     Impairments Affecting Function (Mobility)  balance;endurance/activity  tolerance  -EM       User Key  (r) = Recorded By, (t) = Taken By, (c) = Cosigned By    Initials Name Provider Type    Ximena Blackman PT Physical Therapist    Esther Dhaliwal, RN Case Manager        Mobility     Row Name 08/16/19 1550          Bed Mobility Assessment/Treatment    Bed Mobility Assessment/Treatment  supine-sit  -EM     Supine-Sit [place] (Bed Mobility)  supervision  -EM     Row Name 08/16/19 1550          Sit-Stand Transfer    Sit-Stand [place] (Transfers)  contact guard  -EM     Row Name 08/16/19 1550          Gait/Stairs Assessment/Training    [place] Level (Gait)  contact guard  -EM     Distance in Feet (Gait)  150  -EM     Deviations/Abnormal Patterns (Gait)  stride length decreased  -EM     Bilateral Gait Deviations  heel strike decreased  -EM       User Key  (r) = Recorded By, (t) = Taken By, (c) = Cosigned By    Initials Name Provider Type    Ximena Blackman PT Physical Therapist        Obj/Interventions     Row Name 08/16/19 1553          General ROM    GENERAL ROM COMMENTS  ROm WNL   -EM     Row Name 08/16/19 1553          MMT (Manual Muscle Testing)    General MMT Comments  no focal deficits identified   -EM     Row Name 08/16/19 1553          Static Sitting Balance    Level of [place] (Unsupported Sitting, Static Balance)  supervision  -EM     Row Name 08/16/19 1553          Static Standing Balance    Level of [place] (Supported Standing, Static Balance)  supervision  -EM     Row Name 08/16/19 1553          Sensory Assessment/Intervention    Sensory General Assessment  no sensation deficits identified  -EM       User Key  (r) = Recorded By, (t) = Taken By, (c) = Cosigned By    Initials Name Provider Type    Ximena Blackman PT Physical Therapist        Goals/Plan     Row Name 08/16/19 155          Transfer Goal 1 (PT)    Activity/Assistive Device (Transfer Goal 1, PT)  sit-to-stand/stand-to-sit  -EM     [place] Level/Cues  Needed (Transfer Goal 1, PT)  supervision required  -EM     Time Frame (Transfer Goal 1, PT)  1 week  -EM     Row Name 08/16/19 1555          Gait Training Goal 1 (PT)    Activity/Assistive Device (Gait Training Goal 1, PT)  gait (walking locomotion)  -EM     [place] Level (Gait Training Goal 1, PT)  supervision required  -EM     Distance (Gait Goal 1, PT)  200  -EM     Time Frame (Gait Training Goal 1, PT)  1 week  -EM       User Key  (r) = Recorded By, (t) = Taken By, (c) = Cosigned By    Initials Name Provider Type    EM Ximena Muller, PT Physical Therapist        Clinical Impression     [facility] Name 08/16/19 1555          Pain Assessment    Additional Documentation  Pain Scale: Numbers Pre/Post-Treatment (Group)  -EM     Row Name 08/16/19 1553          Pain Scale: Numbers Pre/Post-Treatment    Pain Scale: Numbers, Pretreatment  0/10 - no pain  -EM     Row Name 08/16/19 1553 08/16/19 1200       Plan of Care Review    Plan of Care Reviewed With  patient;spouse  -EM  patient;family  -VB    [facility] Name 08/16/19 1000 08/16/19 0800       Plan of Care Review    Plan of Care Reviewed With  patient;caregiver;daughter step  -VB  patient;caregiver;other (see comments) step daughter  -VB    Row Name 08/16/19 0706          Plan of Care Review    Plan of Care Reviewed With  patient;caregiver stepdaughter  -DB     Row Name 08/16/19 1553          Physical Therapy Clinical Impression    Patient/Family Goals Statement (PT Clinical Impression)  go home   -EM     Criteria for Skilled Interventions Met (PT Clinical Impression)  yes;treatment indicated  -EM     Rehab Potential (PT Clinical Summary)  good, to achieve stated therapy goals  -EM     Row Name 08/16/19 8130          Vital Signs    Pre Systolic BP Rehab  140  -EM     Pre Treatment Diastolic BP  92  -EM     Pretreatment Heart Rate (beats/min)  56  -EM     Pre SpO2 (%)  98  -EM     O2 Delivery Pre Treatment  room air  -EM     Row Name 08/16/19 0518          Positioning  and Restraints    Pre-Treatment Position  in bed  -EM     Post Treatment Position  bathroom  -EM     Bathroom  sitting nsg assistant in room   -EM       User Key  (r) = Recorded By, (t) = Taken By, (c) = Cosigned By    Initials Name Provider Type    EM Ximena Muller, PT Physical Therapist    Temitope Meyer, RN Registered Nurse    Kenya Barajas RN Registered Nurse        Outcome Measures     Row Name 08/16/19 1559          How much help from another person do you currently need...    Turning from your back to your side while in flat bed without using bedrails?  4  -EM     Moving from lying on back to sitting on the side of a flat bed without bedrails?  3  -EM     Moving to and from a bed to a chair (including a wheelchair)?  3  -EM     Standing up from a chair using your arms (e.g., wheelchair, bedside chair)?  3  -EM     Climbing 3-5 steps with a railing?  3  -EM     To walk in hospital room?  3  -EM     AM-PAC 6 Clicks Score (PT)  19  -EM     Row Name 08/16/19 1558          Functional Assessment    Outcome Measure Options  AM-PAC 6 Clicks Basic Mobility (PT)  -EM       User Key  (r) = Recorded By, (t) = Taken By, (c) = Cosigned By    Initials Name Provider Type    EM Ximena Muller PT Physical Therapist        Physical Therapy Education     Title: PT OT SLP Therapies (In Progress)     Topic: Physical Therapy (In Progress)     Point: Mobility training (In Progress)     Learning Progress Summary           Patient Acceptance, E, NR by EM at 8/16/2019  3:57 PM                               User Key     Initials Effective Dates Name Provider Type Discipline     04/03/18 -  Ximena Muller PT Physical Therapist PT              PT Recommendation and Plan  Planned Therapy Interventions (PT Eval): gait training  Outcome Summary/Treatment Plan (PT)  Anticipated Discharge Disposition (PT): home with assist  Plan of Care Reviewed With: patient, spouse  Outcome Summary: Patient is a 69 y.o.  female admitted to [facility] for accidental overdose on 8/15/2019. PMHx includes Alzheimer's. Patient is independent at baseline and lives with her spouse. Patient does not use AD at home. Today, patient performed bed mobility with supervision, required CGA for transfers, and ambulated 150 feet with CGA.  Patient demonstrates impairments consisting of generalized weakness, decreased activity tolerance. Patient may benefit from skilled PT services acutely to address functional deficits as well as improve level of independence prior to discharge. Anticipate home with assist upon DC, anticipate patient is very close to her baseline level of function. Nsg encouraged to ambulate with patient daily, PT will check back on Monday 8/19 to ensure independence and safety with gait.      Time Calculation:   PT Charges     Row Name 08/16/19 1600             Time Calculation    Start Time  1436  -EM      Stop Time  1455  -EM      Time Calculation (min)  19 min  -EM      PT Received On  08/16/19  -EM      PT - Next Appointment  08/19/19  -EM      PT Goal Re-Cert Due Date  08/21/19  -EM         Time Calculation- PT    Total Timed Code Minutes- PT  8 minute(s)  -EM        User Key  (r) = Recorded By, (t) = Taken By, (c) = Cosigned By    Initials Name Provider Type    EM Ximena Muller, PT Physical Therapist        Therapy Charges for Today     Code Description Service Date Service Provider Modifiers Qty    97553398099 HC PT EVAL MOD COMPLEXITY 2 8/16/2019 Ximena Muller, PT GP 1    36392053347 HC PT THER PROC EA 15 MIN 8/16/2019 Ximena Muller, PT GP 1          PT G-Codes  Outcome Measure Options: AM-PAC 6 Clicks Basic Mobility (PT)  AM-PAC 6 Clicks Score (PT): 19    Ximena Muller PT  8/16/2019

## 2019-08-16 NOTE — ED NOTES
Poison control [facility] called and VS and Pt status was updated. Per [facility] if after 2L fluids not bringing Pt's BP up then they recommend we give glucagon. MD notified     Sarah Berry RN  08/15/19 9046

## 2019-08-16 NOTE — PLAN OF CARE
Problem: Patient Care Overview  Goal: Plan of Care Review  Outcome: Ongoing (interventions implemented as appropriate)   08/16/19 0706   Coping/Psychosocial   Plan of Care Reviewed With patient;caregiver  (stepdaughter)   Plan of Care Review   Progress no change   OTHER   Outcome Summary Received pt from ER for accidental OD of Propranolol, Namenda, Seroquel. Pt with some aphasia noted which has been baseline for a few weeks. BP improved since arrival to ER. NAD. Lethargic, sleeping most of the shift.

## 2019-08-16 NOTE — H&P
[facility]    CC: UTO    HPI:  Mrs. Cox is a 70yo WF with a history of alzhiemer's dementia. Her daughter in law says her dementia has become really severe and she is not normally oriented.  Her pills were put in a pill organizer by her  and he went to do something and while she was unsupervised she took the medications including propranolol, memantine, quetiapine.  She is currently very sleepy and unable to provide any history.  Poison control recommends supportive care.  I discussed the case with Dr. Herrmann in the er.      Past Medical History:   Diagnosis Date   • Dementia    • Hyperlipidemia    • Hypertension    • UTI (urinary tract infection)      Social History     Socioeconomic History   • Marital status:      Spouse name: Not on file   • Number of children: Not on file   • Years of education: Not on file   • Highest education level: Not on file   Tobacco Use   • Smoking status: Never Smoker   Substance and Sexual Activity   • Alcohol use: No     Frequency: Never   • Drug use: No   • Sexual activity: Defer     History reviewed. No pertinent family history.  MEDS: reviewd as per chart  ALL: nkda  ROS: UTO    Vital Sign Min/Max for last 24 hours  Temp  Min: 98.9 °F (37.2 °C)  Max: 98.9 °F (37.2 °C)   BP  Min: 82/47  Max: 142/62   Pulse  Min: 49  Max: 61   Resp  Min: 16  Max: 16   SpO2  Min: 90 %  Max: 99 %   No Data Recorded   Weight  Min: 56.9 kg (125 lb 8 oz)  Max: 56.9 kg (125 lb 8 oz)     GEN:   appears ill,sleepy, difficult to arouse  HEENT: PERRL,  no icterus, mmm, no jvd, trachea midline, neck supple, normal conjunctiva, no palpable thyroid nodules  CHEST: CTA bilat, no wheezes, no crackles, no use of accessory muscles --snoring  CV: ngoc no m/g/r  ABD: soft, nt, nd +bs, no hepatosplenomegaly  EXT: no c/c/e  SKIN: no rashes, no xanthomas, nl turgor  LYMPH: no palpable cervical or supraclavicular lymphadenopathy  NEURO: CN 2-12 nonfocal, can't assess further due to  somnolence  PSYCH: too sedated to assess  MSK: some kyphosis, moves all 4 extremities.    Labs: 8/15: reviewed  Glucose 95  Bun 18  Cr 1  Na 145  Bicarb 24  Wbc 7  hgb 12  plts 281    A/P:  1. Accidental drug overdose -- supportive care, monitor in ICU.    2. Dementia -- rather advanced, she may need placement at this point, will need social work to meet with family at somepoint.  3. Bradycardia - monitor for now -- will give prn meds if needed, glucagon in severe care, but I doubt it will come to that  4. Code status -- daughter in law says heroic measures in the short term likely ok, but defers decision to patient's , but does not wish he be disturbed at this point.    CC 35 mins.

## 2019-08-16 NOTE — NURSING NOTE
Pt has been confused all shift.  Family remains at bedside.  BP labile md aware.   Updated poison control to patients condition.  Walked with physical therapy.

## 2019-08-16 NOTE — PROGRESS NOTES
"  PROGRESS NOTE  Patient Name: Dee Dee Cox  Age/Sex: 69 y.o. female  : 1950  MRN: [id number]    Date of Admission: 8/15/2019  Date of Encounter Visit: 19   LOS: 1 day   Patient Care Team:  Zee Daley MD as PCP - General (Internal Medicine)    Chief Complaint: More awake and alert    Hospital course: Admitted with accidental drug overdose with propranolol, is still bradycardic whenever she goes to sleep with heart rate in the 40s and systolic in the 90s, when awake her heart rate is up in the 90s and her systolic is in the 170s.  Her mental status did improve compared to earlier, she is asking for something to eat, she is afebrile, no suspected aspiration, no loss of consciousness.    Interval History: Overall gradual improvement in her alertness level as compared to initial presentation    REVIEW OF SYSTEMS:   CONSTITUTIONAL: no fever or chills  CARDIOVASCULAR: No chest pain, chest pressure or chest discomfort. No palpitations or edema.   RESPIRATORY: No shortness of breath, cough or sputum.   GASTROINTESTINAL: No anorexia, nausea, vomiting or diarrhea. No abdominal pain or blood.   HEMATOLOGIC: No bleeding or bruising.     Ventilator/Non-Invasive Ventilation Settings (From admission, onward)    None            Vital Signs  Temp:  [97.2 °F (36.2 °C)-98.9 °F (37.2 °C)] 98 °F (36.7 °C)  Heart Rate:  [49-70] 53  Resp:  [14-16] 14  BP: ()/() 153/76  SpO2:  [90 %-100 %] 98 %  on    Device (Oxygen Therapy): room air    Intake/Output Summary (Last 24 hours) at 2019 1421  Last data filed at 2019 0639  Gross per 24 hour   Intake 2406.59 ml   Output 800 ml   Net 1606.59 ml     Flowsheet Rows      First Filed Value   Admission Height  165.1 cm (65\") Documented at 08/15/2019 2246   Admission Weight  56.9 kg (125 lb 8 oz) Documented at 08/15/2019 2013        Body mass index is 21.39 kg/m².      08/15/19  2013 08/16/19  0020   Weight: 56.9 kg (125 lb 8 oz) 58.3 kg (128 lb 8.5 oz) "       Physical Exam:  GEN:  No acute distress, alert, cooperative, well developed more awake and alert as compared to the initial assessment by my partner last night  EYES:   Sclera clear. No icterus. PERRL. Normal EOM  ENT:   External ears/nose normal, no oral lesions, no thrush, mucous membranes moist  NECK:  Supple, midline trachea, no JVD  LUNGS: Normal chest on inspection, CTAB, no wheezes. No rhonchi. No crackles. Respirations regular, even and unlabored.   CV:  Regular rhythm and rate. Normal S1/S2. No murmurs, gallops, or rubs noted.  ABD:  Soft, non-tender and non-distended. Normal bowel sounds. No guarding  EXT:  Moves all extremities well. No cyanosis. No redness. No edema.   Skin: dry, intact, no bleeding    Results Review:      Results from last 7 days   Lab Units 08/16/19  0540 08/15/19  2016   SODIUM mmol/L 144 145   POTASSIUM mmol/L 4.0 4.3   CHLORIDE mmol/L 109* 108*   CO2 mmol/L 22.3 24.4   BUN mg/dL 16 18   CREATININE mg/dL 0.76 1.00   CALCIUM mg/dL 8.8 9.4   AST (SGOT) U/L 23 21   ALT (SGPT) U/L 21 22   ANION GAP mmol/L 12.7 12.6   ALBUMIN g/dL 3.90 4.30                 Results from last 7 days   Lab Units 08/16/19  0830 08/15/19  2016   WBC 10*3/mm3 7.98 7.06   HEMOGLOBIN g/dL 13.0 12.7   HEMATOCRIT % 41.9 41.0   PLATELETS 10*3/mm3 288 281   MCV fL 98.1* 98.3*   NEUTROPHIL % % 63.9 49.7   LYMPHOCYTE % % 24.3 35.8   MONOCYTES % % 9.4 11.5   EOSINOPHIL % % 1.5 2.1   BASOPHIL % % 0.4 0.6   IMM GRAN % % 0.5 0.3                   Invalid input(s): LDLCALC  Results from last 7 days   Lab Units 08/16/19  0038   PH, ARTERIAL pH units 7.408   PCO2, ARTERIAL mm Hg 34.8*   PO2 ART mm Hg 89.5   HCO3 ART mmol/L 22.0         Glucose   Date/Time Value Ref Range Status   08/16/2019 1218 96 70 - 130 mg/dL Final   08/16/2019 0758 90 70 - 130 mg/dL Final   08/16/2019 0405 83 70 - 130 mg/dL Final   08/16/2019 0015 65 (L) 70 - 130 mg/dL Final                           Imaging:   Imaging Results (all)     None               Medication Review:     enoxaparin 40 mg Subcutaneous Q24H   insulin lispro 0-7 Units Subcutaneous Q4H   lactated ringers 1,000 mL Intravenous Once   sennosides-docusate sodium 2 tablet Oral Nightly   sodium chloride 3 mL Intravenous Q12H         dextrose 5 % and sodium chloride 0.45 % 75 mL/hr Last Rate: 75 mL/hr (08/16/19 0102)       ASSESSMENT:   Accidental overdose with propranolol  Mild dementia  Bradycardia with hypotension secondary to the medication ingested      PLAN:  Patient is doing better, however whenever she goes to sleep her heart rate slows down her blood pressure dropped some.  We will continue to monitor in the ICU, there is no need for any calcium or glucagon at this point, I expect that to improve as time passes by.  Patient if any is hypertensive when she is awake.  Patient is alert and awake and should be cleared for p.o. diet, will start and will advance as tolerated  She was on D5 with half-normal saline at this point and she is on Lovenox for DVT prophylaxis which will be continued, may discontinue the IV fluid as her p.o. intake improved    Discussed with patient and the family at the bedside    Disposition: Monitor in the ICU until more stable especially while asleep    Giancarlo Linn MD  08/16/19  2:21 PM             Dictated utilizing Dragon dictation

## 2019-08-16 NOTE — PLAN OF CARE
Problem: Patient Care Overview  Goal: Plan of Care Review   08/16/19 1557   OTHER   Outcome Summary Patient is a 69 y.o. female admitted to [facility] for accidental overdose on 8/15/2019. PMHx includes Alzheimer's. Patient is independent at baseline and lives with her spouse. Patient does not use AD at home. Today, patient performed bed mobility with supervision, required CGA for transfers, and ambulated 150 feet with CGA. Patient demonstrates impairments consisting of generalized weakness, decreased activity tolerance. Patient may benefit from skilled PT services acutely to address functional deficits as well as improve level of independence prior to discharge. Anticipate home with assist upon DC, anticipate patient is very close to her baseline level of function. Nsg encouraged to ambulate with patient daily, PT will check back on Monday 8/19 to ensure independence and safety with gait.

## 2019-08-16 NOTE — ED PROVIDER NOTES
EMERGENCY DEPARTMENT ENCOUNTER    Room Number:  16/16  Date seen:  8/15/2019  Time seen: 8:11 PM  PCP: Zee Daley MD  Historian: EMS      HPI:  Chief Complaint: Accidental drug overdose  A complete HPI/ROS/PMH/PSH/SH/FH are unobtainable due to: history of Alzhiemers  Context: Dee Dee Cox is a 69 y.o. female who presents to the ED c/o accidental drug overdose. Per EMS, patient took 6 different medications 6 times each approx 1 hour ago. Vitals stable en route. BS 120s.       PAST MEDICAL HISTORY  Active Ambulatory Problems     Diagnosis Date Noted   • Syncope 07/11/2019   • Hypernatremia 07/11/2019   • Early onset Alzheimer's dementia 07/11/2019   • Acute urinary retention 07/11/2019   • Overflow incontinence 07/11/2019   • Adverse effect of drug or medicament 07/12/2019     Resolved Ambulatory Problems     Diagnosis Date Noted   • No Resolved Ambulatory Problems     Past Medical History:   Diagnosis Date   • Dementia    • Hyperlipidemia    • Hypertension    • UTI (urinary tract infection)          PAST SURGICAL HISTORY  History reviewed. No pertinent surgical history.      FAMILY HISTORY  History reviewed. No pertinent family history.      SOCIAL HISTORY  Social History     Socioeconomic History   • Marital status:      Spouse name: Not on file   • Number of children: Not on file   • Years of education: Not on file   • Highest education level: Not on file   Tobacco Use   • Smoking status: Never Smoker   Substance and Sexual Activity   • Alcohol use: No     Frequency: Never   • Drug use: No   • Sexual activity: Defer         ALLERGIES  Cyproheptadine and Levaquin [levofloxacin]        REVIEW OF SYSTEMS  Review of Systems   Unable to perform ROS: Other          PHYSICAL EXAM  ED Triage Vitals   Temp Pulse Resp BP SpO2   -- -- -- -- --      Temp src Heart Rate Source Patient Position BP Location FiO2 (%)   -- -- -- -- --         GENERAL: not distressed  HENT: nares patent  EYES: no scleral icterus.  Pupils 2mm minimally reaction  CV: regular rhythm, regular rate  RESPIRATORY: normal effort, CTAB  ABDOMEN: soft, nontender  MUSCULOSKELETAL: no deformity  NEURO: Localizes to pain, mumbles coherent words, does not open eyes to pain. Rosanna  SKIN: warm, dry    Vital signs and nursing notes reviewed.          LAB RESULTS  Recent Results (from the past 24 hour(s))   Comprehensive Metabolic Panel    Collection Time: 08/15/19  8:16 PM   Result Value Ref Range    Glucose 95 65 - 99 mg/dL    BUN 18 8 - 23 mg/dL    Creatinine 1.00 0.57 - 1.00 mg/dL    Sodium 145 136 - 145 mmol/L    Potassium 4.3 3.5 - 5.2 mmol/L    Chloride 108 (H) 98 - 107 mmol/L    CO2 24.4 22.0 - 29.0 mmol/L    Calcium 9.4 8.6 - 10.5 mg/dL    Total Protein 6.9 6.0 - 8.5 g/dL    Albumin 4.30 3.50 - 5.20 g/dL    ALT (SGPT) 22 1 - 33 U/L    AST (SGOT) 21 1 - 32 U/L    Alkaline Phosphatase 110 39 - 117 U/L    Total Bilirubin 0.4 0.2 - 1.2 mg/dL    eGFR Non African Amer 55 (L) >60 mL/min/1.73    Globulin 2.6 gm/dL    A/G Ratio 1.7 g/dL    BUN/Creatinine Ratio 18.0 7.0 - 25.0    Anion Gap 12.6 5.0 - 15.0 mmol/L   Acetaminophen Level    Collection Time: 08/15/19  8:16 PM   Result Value Ref Range    Acetaminophen <5.0 (L) 10.0 - 30.0 mcg/mL   Ethanol    Collection Time: 08/15/19  8:16 PM   Result Value Ref Range    Ethanol <10 0 - 10 mg/dL    Ethanol % <0.010 %   Salicylate Level    Collection Time: 08/15/19  8:16 PM   Result Value Ref Range    Salicylate <0.3 <=30.0 mg/dL   CBC Auto Differential    Collection Time: 08/15/19  8:16 PM   Result Value Ref Range    WBC 7.06 3.40 - 10.80 10*3/mm3    RBC 4.17 3.77 - 5.28 10*6/mm3    Hemoglobin 12.7 12.0 - 15.9 g/dL    Hematocrit 41.0 34.0 - 46.6 %    MCV 98.3 (H) 79.0 - 97.0 fL    MCH 30.5 26.6 - 33.0 pg    MCHC 31.0 (L) 31.5 - 35.7 g/dL    RDW 14.0 12.3 - 15.4 %    RDW-SD 51.3 37.0 - 54.0 fl    MPV 9.9 6.0 - 12.0 fL    Platelets 281 140 - 450 10*3/mm3    Neutrophil % 49.7 42.7 - 76.0 %    Lymphocyte % 35.8 19.6 - 45.3  %    Monocyte % 11.5 5.0 - 12.0 %    Eosinophil % 2.1 0.3 - 6.2 %    Basophil % 0.6 0.0 - 1.5 %    Immature Grans % 0.3 0.0 - 0.5 %    Neutrophils, Absolute 3.51 1.70 - 7.00 10*3/mm3    Lymphocytes, Absolute 2.53 0.70 - 3.10 10*3/mm3    Monocytes, Absolute 0.81 0.10 - 0.90 10*3/mm3    Eosinophils, Absolute 0.15 0.00 - 0.40 10*3/mm3    Basophils, Absolute 0.04 0.00 - 0.20 10*3/mm3    Immature Grans, Absolute 0.02 0.00 - 0.05 10*3/mm3    nRBC 0.1 0.0 - 0.2 /100 WBC   POC Glucose Once    Collection Time: 08/16/19 12:15 AM   Result Value Ref Range    Glucose 65 (L) 70 - 130 mg/dL   Blood Gas, Arterial    Collection Time: 08/16/19 12:38 AM   Result Value Ref Range    Site Arterial: right radial     Phill's Test Positive     pH, Arterial 7.408 7.350 - 7.450 pH units    pCO2, Arterial 34.8 (L) 35.0 - 45.0 mm Hg    pO2, Arterial 89.5 80.0 - 100.0 mm Hg    HCO3, Arterial 22.0 22.0 - 28.0 mmol/L    Base Excess, Arterial -2.2 (L) 0.0 - 2.0 mmol/L    O2 Saturation Calculated 97.1 92.0 - 99.0 %    Barometric Pressure for Blood Gas 752.6 mmHg    Modality Room Air     Rate 12 Breaths/minute       Ordered the above labs and reviewed the results.        RADIOLOGY  No orders to display            PROCEDURES  Critical Care  Performed by: Allan Herrmann II, MD  Authorized by: Allan Herrmann II, MD     Critical care provider statement:     Critical care time (minutes):  40    Critical care was necessary to treat or prevent imminent or life-threatening deterioration of the following conditions:  Circulatory failure and toxidrome    Critical care was time spent personally by me on the following activities:  Development of treatment plan with patient or surrogate, discussions with consultants, examination of patient, evaluation of patient's response to treatment, pulse oximetry, re-evaluation of patient's condition, ordering and review of laboratory studies, ordering and review of radiographic studies, ordering and performing  treatments and interventions and obtaining history from patient or surrogate            EKG:           EKG time: 2017   Rhythm/Rate: NSR, 56  P waves and CA: Normal  QRS, axis: Normal   ST and T waves: TWI in AVL    Interpreted Contemporaneously by me, independently viewed  changed compared to prior 7/11/19 when patient had RAD that is no longer present        MEDICATIONS GIVEN IN ER  Medications   sodium chloride 0.9 % flush 10 mL (not administered)   lactated ringers bolus 1,000 mL (0 mL Intravenous Hold 8/16/19 0103)   dextrose 5 % and sodium chloride 0.45 % infusion (75 mL/hr Intravenous New Bag 8/16/19 0102)   dextrose (GLUTOSE) oral gel 15 g (not administered)   dextrose (D50W) 25 g/ 50mL Intravenous Solution 25 g (not administered)   glucagon (human recombinant) (GLUCAGEN DIAGNOSTIC) injection 1 mg (not administered)   sodium chloride 0.9 % flush 3 mL (3 mL Intravenous Given 8/16/19 0104)   sodium chloride 0.9 % flush 3-10 mL (not administered)   sennosides-docusate sodium (SENOKOT-S) 8.6-50 MG tablet 2 tablet (2 tablets Oral Not Given 8/16/19 0104)   ipratropium-albuterol (DUO-NEB) nebulizer solution 3 mL (not administered)   enoxaparin (LOVENOX) syringe 40 mg (not administered)   acetaminophen (TYLENOL) tablet 650 mg (not administered)     Or   acetaminophen (TYLENOL) suppository 650 mg (not administered)   potassium chloride (MICRO-K) CR capsule 40 mEq (not administered)     Or   potassium chloride (KLOR-CON) packet 40 mEq (not administered)     Or   potassium chloride 10 mEq in 100 mL IVPB (not administered)   Magnesium Sulfate 2 gram Bolus, followed by 8 gram infusion (total Mg dose 10 grams)- Mg less than or equal to 1mg/dL (not administered)     Or   Magnesium Sulfate 2 gram / 50mL Infusion (GIVE X 3 BAGS TO EQUAL 6GM TOTAL DOSE) - Mg 1.1 - 1.5 mg/dl (not administered)     Or   Magnesium Sulfate 4 gram infusion- Mg 1.6-1.9 mg/dL (not administered)   insulin lispro (humaLOG) injection 0-7 Units (0 Units  Subcutaneous Not Given 8/16/19 0108)   ondansetron (ZOFRAN) injection 4 mg (not administered)   sodium chloride 0.9 % bolus 1,000 mL (0 mL Intravenous Stopped 8/15/19 2249)   atropine sulfate injection 0.5 mg (0.5 mg Intravenous Given 8/15/19 2248)   lactated ringers bolus 1,000 mL (0 mL Intravenous Stopped 8/15/19 2348)           PROGRESS AND CONSULTS  ED Course as of Aug 16 0109   Thu Aug 15, 2019   2014 She presents as an accidental overdose.  She took 60 mg of memantine, 300 mg of quetiapine, 6 mg of alprazolam, 90 mg of buspirone, 480 mg of propranolol.  She took these approximately 1 hour ago.  On arrival, the patient is somnolent but localizes to painful stimulus and mumbles comprehensible words.  [TD]   2221 Acetaminophen: (!) <5.0 [TD]   2221 Salicylate: <0.3 [TD]   2221 Ethanol: <10 [TD]      ED Course User Index  [TD] Allan Herrmann II, MD     2014: Blood work, UA drug screen, EKG ordered.     2045: Spoke with Oswaldo with poison control. Recommends supportive care and observation.     MEDICAL DECISION MAKING      MDM  Number of Diagnoses or Management Options  Accidental overdose, initial encounter:   Alzheimer's dementia without behavioral disturbance, unspecified timing of dementia onset:   Hypotension, unspecified hypotension type:   Diagnosis management comments: Pt with hypotension. Required atropine and IVF. She is having BP fluctuations but has remained rather stable over several  Hours so far. Ok to hold off on glucagon now but I do believe she needs close monitoring in ICU.     Family at bedside states patient took pills right as her  had left for a few minutes to go to the bathroom.        Amount and/or Complexity of Data Reviewed  Clinical lab tests: ordered and reviewed  Decide to obtain previous medical records or to obtain history from someone other than the patient: yes  Obtain history from someone other than the patient: yes  Discuss the patient with other providers: yes (  Christopher Draper)               DIAGNOSIS  Final diagnoses:   Accidental overdose, initial encounter   Hypotension, unspecified hypotension type         DISPOSITION  Admit            Latest Documented Vital Signs:  As of 1:09 AM  BP- 114/79 HR- 64 Temp- 97.3 °F (36.3 °C) (Oral) O2 sat- 100%        --  Documentation assistance provided by ashely Batista for Dr. Montez MD.  Information recorded by the scribe was done at my direction and has been verified and validated by me.               Maria E Batista  08/15/19 4272       Allan Herrmann II, MD  08/16/19 4166

## 2019-08-16 NOTE — PROGRESS NOTES
Discharge Planning Assessment  [facility]     Patient Name: Dee Dee Cox  MRN: [id number]  Today's Date: 8/16/2019    Admit Date: 8/15/2019    Discharge Needs Assessment     Row Name 08/16/19 1521       Living Environment    Lives With  spouse    Current Living Arrangements  home/apartment/condo    Primary Care Provided by  self    Provides Primary Care For  no one    Family Caregiver if Needed  spouse    Quality of Family Relationships  supportive       Resource/Environmental Concerns    Resource/Environmental Concerns  none       Transition Planning    Patient/Family Anticipates Transition to  home with family    Patient/Family Anticipated Services at Transition  none    Transportation Anticipated  family or friend will provide       Discharge Needs Assessment    Concerns to be Addressed  no discharge needs identified    Equipment Currently Used at Home  bath bench    Anticipated Changes Related to Illness  none    Equipment Needed After Discharge  none        Discharge Plan     Row Name 08/16/19 1521       Plan    Plan  Home no needs    Plan Comments  IMM noted.  CCP spoke to patient and Huy her  [phone] at bedside to discuss discharge planning.  CCP role explained.  Face sheet verified.  Her  is her emergency contact.    Her PCP is Dr. Donn Daley .  Pt is independent with ADL's.  She uses no DME to ambulate.  She has no history of Home Health.  She has not been to rehab in the past.   She is independent with ADL's.   Plan is to return Home with no needs  CCP following  .        Destination      No service coordination in this encounter.      Durable Medical Equipment      No service coordination in this encounter.      Dialysis/Infusion      No service coordination in this encounter.      Home Medical Care      No service coordination in this encounter.      Therapy      No service coordination in this encounter.      Community Resources      No service coordination in this  encounter.          Demographic Summary    No documentation.       Functional Status    No documentation.       Psychosocial    No documentation.       Abuse/Neglect    No documentation.       Legal    No documentation.       Substance Abuse    No documentation.       Patient Forms    No documentation.           Esther Wallace RN

## 2019-08-17 LAB
ANION GAP SERPL CALCULATED.3IONS-SCNC: 14.5 MMOL/L (ref 5–15)
BUN BLD-MCNC: 11 MG/DL (ref 8–23)
BUN/CREAT SERPL: 16.4 (ref 7–25)
CALCIUM SPEC-SCNC: 9.3 MG/DL (ref 8.6–10.5)
CHLORIDE SERPL-SCNC: 107 MMOL/L (ref 98–107)
CO2 SERPL-SCNC: 21.5 MMOL/L (ref 22–29)
CREAT BLD-MCNC: 0.67 MG/DL (ref 0.57–1)
GFR SERPL CREATININE-BSD FRML MDRD: 87 ML/MIN/1.73
GLUCOSE BLD-MCNC: 98 MG/DL (ref 65–99)
GLUCOSE BLDC GLUCOMTR-MCNC: 100 MG/DL (ref 70–130)
GLUCOSE BLDC GLUCOMTR-MCNC: 101 MG/DL (ref 70–130)
GLUCOSE BLDC GLUCOMTR-MCNC: 102 MG/DL (ref 70–130)
GLUCOSE BLDC GLUCOMTR-MCNC: 106 MG/DL (ref 70–130)
GLUCOSE BLDC GLUCOMTR-MCNC: 84 MG/DL (ref 70–130)
GLUCOSE BLDC GLUCOMTR-MCNC: 99 MG/DL (ref 70–130)
POTASSIUM BLD-SCNC: 3.6 MMOL/L (ref 3.5–5.2)
SODIUM BLD-SCNC: 143 MMOL/L (ref 136–145)

## 2019-08-17 PROCEDURE — 25010000002 ENOXAPARIN PER 10 MG: Performed by: INTERNAL MEDICINE

## 2019-08-17 PROCEDURE — 82962 GLUCOSE BLOOD TEST: CPT

## 2019-08-17 PROCEDURE — 80048 BASIC METABOLIC PNL TOTAL CA: CPT | Performed by: INTERNAL MEDICINE

## 2019-08-17 RX ORDER — MEMANTINE HYDROCHLORIDE 10 MG/1
10 TABLET ORAL DAILY
Status: DISCONTINUED | OUTPATIENT
Start: 2019-08-17 | End: 2019-08-18 | Stop reason: HOSPADM

## 2019-08-17 RX ORDER — ATORVASTATIN CALCIUM 20 MG/1
20 TABLET, FILM COATED ORAL DAILY
Status: DISCONTINUED | OUTPATIENT
Start: 2019-08-17 | End: 2019-08-18 | Stop reason: HOSPADM

## 2019-08-17 RX ORDER — BUSPIRONE HYDROCHLORIDE 15 MG/1
15 TABLET ORAL 3 TIMES DAILY
Status: DISCONTINUED | OUTPATIENT
Start: 2019-08-17 | End: 2019-08-18 | Stop reason: HOSPADM

## 2019-08-17 RX ADMIN — ALPRAZOLAM 0.5 MG: 0.5 TABLET ORAL at 09:04

## 2019-08-17 RX ADMIN — SODIUM CHLORIDE, PRESERVATIVE FREE 3 ML: 5 INJECTION INTRAVENOUS at 20:00

## 2019-08-17 RX ADMIN — ALPRAZOLAM 0.5 MG: 0.5 TABLET ORAL at 02:32

## 2019-08-17 RX ADMIN — ENOXAPARIN SODIUM 40 MG: 40 INJECTION SUBCUTANEOUS at 09:03

## 2019-08-17 NOTE — PROGRESS NOTES
"  Daily Progress Note.   [facility] INTENSIVE CARE  8/17/2019    Patient:  Name:  Dee Dee Cox  MRN:  [id number]  1950  69 y.o.  female         CC: Bradycardia with accidental overdose on propanolol.    Interval History:  Patient admitted on 815 secondary to accidental drug overdose with propanolol.  Patient resulted in bradycardia.  Patient does have a history of Alzheimer's dementia.  Per report patient does independent at baseline.  Apparently patient took all the pills and her pill organizer including propanolol and amantadine and quetiapine.    Patient has been admitted to the ICU.  Her blood pressure does drop when her heart rate drops when she goes to sleep.    She has improved since arrival.  She remains confused, a little more than baseline according to patients  at bedside.    ROS: No fever, no diarrhea, no chest pain  PMFSSH: no change    Physical Exam:  /68   Pulse 73   Temp 98 °F (36.7 °C) (Oral)   Resp 20   Ht 165.1 cm (65\")   Wt 58.3 kg (128 lb 8.5 oz)   SpO2 97%   BMI 21.39 kg/m²   Body mass index is 21.39 kg/m².    Intake/Output Summary (Last 24 hours) at 8/17/2019 1143  Last data filed at 8/17/2019 0500  Gross per 24 hour   Intake 1374 ml   Output --   Net 1374 ml     General appearance: NAD, conversant   Eyes: anicteric sclerae, moist conjunctivae; no lid-lag; PERRLA  HENT: Atraumatic; oropharynx clear with moist mucous membranes and no mucosal ulcerations; normal hard and soft palate  Neck: Trachea midline; FROM, supple, no thyromegaly or lymphadenopathy  Lungs: CTA, with normal respiratory effort and no intercostal retractions  CV: RRR, no MRGs   Abdomen: Soft, non-tender; no masses or HSM  Extremities: No peripheral edema or extremity lymphadenopathy  Skin: Normal temperature, turgor and texture; no rash, ulcers or subcutaneous nodules  Psych: Appropriate affect, alert but not oriented    Data Review:  Notable Labs:  Results from last 7 days   Lab Units " 08/16/19  0830 08/15/19  2016   WBC 10*3/mm3 7.98 7.06   HEMOGLOBIN g/dL 13.0 12.7   PLATELETS 10*3/mm3 288 281     Results from last 7 days   Lab Units 08/17/19  0612 08/16/19  0540 08/15/19  2016   SODIUM mmol/L 143 144 145   POTASSIUM mmol/L 3.6 4.0 4.3   CHLORIDE mmol/L 107 109* 108*   CO2 mmol/L 21.5* 22.3 24.4   BUN mg/dL 11 16 18   CREATININE mg/dL 0.67 0.76 1.00   GLUCOSE mg/dL 98 92 95   CALCIUM mg/dL 9.3 8.8 9.4   Estimated Creatinine Clearance: 59.4 mL/min (by C-G formula based on SCr of 0.67 mg/dL).    Imaging:  Reviewed chest images personally from past 3 days    Scheduled meds:      enoxaparin 40 mg Subcutaneous Q24H   insulin lispro 0-7 Units Subcutaneous Q4H   lactated ringers 1,000 mL Intravenous Once   sennosides-docusate sodium 2 tablet Oral Nightly   sodium chloride 3 mL Intravenous Q12H       ASSESSMENT  /  PLAN:  Accidental overdose with propranolol  Alzheimers dementia  Bradycardia with hypotension secondary to the medication ingested improving    Out of unit  Hold BB continue to monitor HR with BP overnight and if no issues home tomorrow.  Add back memantadine    D/w /caretaker at bedside.  Wishes to move out of unit and I agree.      Cortez Draper MD  [facility]  08/17/19  11:56 AM

## 2019-08-17 NOTE — PLAN OF CARE
Problem: Fall Risk (Adult)  Goal: Identify Related Risk Factors and Signs and Symptoms  Outcome: Ongoing (interventions implemented as appropriate)    Goal: Absence of Fall  Outcome: Ongoing (interventions implemented as appropriate)      Problem: Patient Care Overview  Goal: Plan of Care Review  Outcome: Ongoing (interventions implemented as appropriate)   08/17/19 1520   OTHER   Outcome Summary vss. pt disoriented x's 4 but pleasant. in posey vest restraint. no c/o pain     Goal: Individualization and Mutuality  Outcome: Ongoing (interventions implemented as appropriate)    Goal: Discharge Needs Assessment  Outcome: Ongoing (interventions implemented as appropriate)    Goal: Interprofessional Rounds/Family Conf  Outcome: Ongoing (interventions implemented as appropriate)      Problem: Skin Injury Risk (Adult)  Goal: Identify Related Risk Factors and Signs and Symptoms  Outcome: Ongoing (interventions implemented as appropriate)    Goal: Skin Health and Integrity  Outcome: Ongoing (interventions implemented as appropriate)

## 2019-08-17 NOTE — PLAN OF CARE
Problem: Restraint, Nonbehavioral (Nonviolent)  Goal: Rationale and Justification  Outcome: Ongoing (interventions implemented as appropriate)

## 2019-08-18 VITALS
HEART RATE: 89 BPM | SYSTOLIC BLOOD PRESSURE: 119 MMHG | HEIGHT: 65 IN | RESPIRATION RATE: 16 BRPM | BODY MASS INDEX: 20.41 KG/M2 | DIASTOLIC BLOOD PRESSURE: 69 MMHG | WEIGHT: 122.5 LBS | OXYGEN SATURATION: 96 % | TEMPERATURE: 98.6 F

## 2019-08-18 LAB
GLUCOSE BLDC GLUCOMTR-MCNC: 84 MG/DL (ref 70–130)
GLUCOSE BLDC GLUCOMTR-MCNC: 85 MG/DL (ref 70–130)

## 2019-08-18 PROCEDURE — 82962 GLUCOSE BLOOD TEST: CPT

## 2019-08-18 PROCEDURE — 25010000002 ENOXAPARIN PER 10 MG: Performed by: INTERNAL MEDICINE

## 2019-08-18 RX ORDER — PROPRANOLOL HYDROCHLORIDE 10 MG/1
60 TABLET ORAL 3 TIMES DAILY
Qty: 90 TABLET | Refills: 0 | Status: ON HOLD | OUTPATIENT
Start: 2019-08-18 | End: 2021-03-14

## 2019-08-18 RX ADMIN — SODIUM CHLORIDE, PRESERVATIVE FREE 3 ML: 5 INJECTION INTRAVENOUS at 10:08

## 2019-08-18 RX ADMIN — MEMANTINE HYDROCHLORIDE 10 MG: 10 TABLET, FILM COATED ORAL at 10:07

## 2019-08-18 RX ADMIN — BUSPIRONE HYDROCHLORIDE 15 MG: 15 TABLET ORAL at 10:08

## 2019-08-18 RX ADMIN — ATORVASTATIN CALCIUM 20 MG: 20 TABLET, FILM COATED ORAL at 10:07

## 2019-08-18 RX ADMIN — ENOXAPARIN SODIUM 40 MG: 40 INJECTION SUBCUTANEOUS at 08:00

## 2019-08-18 NOTE — PLAN OF CARE
Problem: Restraint, Nonbehavioral (Nonviolent)  Goal: Rationale and Justification   08/18/19 1411   Restraint, Nonbehavioral (Nonviolent)   Rationale and Justification failure of less restrictive safety measures

## 2019-08-18 NOTE — PLAN OF CARE
Problem: Fall Risk (Adult)  Goal: Identify Related Risk Factors and Signs and Symptoms  Outcome: Outcome(s) achieved Date Met: 08/18/19    Goal: Absence of Fall  Outcome: Ongoing (interventions implemented as appropriate)      Problem: Patient Care Overview  Goal: Plan of Care Review  Outcome: Ongoing (interventions implemented as appropriate)   08/18/19 0032   Coping/Psychosocial   Plan of Care Reviewed With patient   Plan of Care Review   Progress no change   OTHER   Outcome Summary VSS; pt still in posey vest; no c/o pain or nausea; no s/s of distress; Bladder scan showed 95 mL at 2300; Pt urinated around 0400       Problem: Skin Injury Risk (Adult)  Goal: Identify Related Risk Factors and Signs and Symptoms  Outcome: Outcome(s) achieved Date Met: 08/18/19    Goal: Skin Health and Integrity  Outcome: Ongoing (interventions implemented as appropriate)      Problem: Restraint, Nonbehavioral (Nonviolent)  Goal: Rationale and Justification  Outcome: Ongoing (interventions implemented as appropriate)    Goal: Nonbehavioral (Nonviolent) Restraint: Absence of Injury/Harm  Outcome: Ongoing (interventions implemented as appropriate)    Goal: Nonbehavioral (Nonviolent) Restraint: Achievement of Discontinuation Criteria  Outcome: Ongoing (interventions implemented as appropriate)    Goal: Nonbehavioral (Nonviolent) Restraint: Preservation of Dignity and Wellbeing  Outcome: Ongoing (interventions implemented as appropriate)

## 2019-08-18 NOTE — DISCHARGE SUMMARY
PHYSICIAN DISCHARGE SUMMARY                                                                          [facility]    Patient Identification:  Name: Dee Dee Cox  Age: 69 y.o.  Sex: female  :  1950  MRN: [id number]  Primary Care Physician: Zee Daley MD    Admit date: 8/15/2019  Discharge date:2019  Discharged Condition: fair    Discharge Diagnoses:    Accidental overdose       Hospital Course: Patient was admitted to the icu secondary to unintentional polypharmacy overdose involving beta blockers with resulting bradycardia.  Patient improved and was discharged on reduced dose of her home BB.  She was evaluted by PT and  felt comfortable taking her home.  Offdred home health, declined.  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    Consults:   IP CONSULT TO PULMONOLOGY    Consults:   IP CONSULT TO PULMONOLOGY    Significant Discharge Diagnostics   Procedures Performed:         Pertinent Lab Results:  Results from last 7 days   Lab Units 19  0612 19  0540 08/15/19  2016   SODIUM mmol/L 143 144 145   POTASSIUM mmol/L 3.6 4.0 4.3   CHLORIDE mmol/L 107 109* 108*   CO2 mmol/L 21.5* 22.3 24.4   BUN mg/dL 11 16 18   CREATININE mg/dL 0.67 0.76 1.00   GLUCOSE mg/dL 98 92 95   CALCIUM mg/dL 9.3 8.8 9.4   AST (SGOT) U/L  --  23 21   ALT (SGPT) U/L  --  21 22         Results from last 7 days   Lab Units 19  0830 08/15/19  2016   WBC 10*3/mm3 7.98 7.06   HEMOGLOBIN g/dL 13.0 12.7   HEMATOCRIT % 41.9 41.0   PLATELETS 10*3/mm3 288 281   MCV fL 98.1* 98.3*   MCH pg 30.4 30.5   MCHC g/dL 31.0* 31.0*   RDW % 14.2 14.0   RDW-SD fl 51.6 51.3   MPV fL 10.1 9.9   NEUTROPHIL % % 63.9 49.7   LYMPHOCYTE % % 24.3 35.8   MONOCYTES % % 9.4 11.5   EOSINOPHIL % % 1.5 2.1   BASOPHIL % % 0.4 0.6   IMM GRAN % % 0.5 0.3   NEUTROS ABS 10*3/mm3 5.10 3.51   LYMPHS ABS 10*3/mm3 1.94  "2.53   MONOS ABS 10*3/mm3 0.75 0.81   EOS ABS 10*3/mm3 0.12 0.15   BASOS ABS 10*3/mm3 0.03 0.04   IMMATURE GRANS (ABS) 10*3/mm3 0.04 0.02   NRBC /100 WBC 0.0 0.1                   Invalid input(s): LDLCALC          Results from last 7 days   Lab Units 08/16/19  0038   PH, ARTERIAL pH units 7.408   PO2 ART mm Hg 89.5   PCO2, ARTERIAL mm Hg 34.8*   HCO3 ART mmol/L 22.0                                   Imaging Results:  Imaging Results (all)     None          Discharge Exam:  /69 (BP Location: Left arm, Patient Position: Lying)   Pulse 89   Temp 98.6 °F (37 °C) (Oral)   Resp 16   Ht 165.1 cm (65\")   Wt 55.6 kg (122 lb 8 oz)   SpO2 96%   BMI 20.39 kg/m²   GENERAL:  NAD, Aox3  HEENT:  EOMI, nonicteric sclera, no JVD  PULMONARY:    CTAB, no wheeze, no crackles, no rhonchi, symmetric air entry  CARDIAC:  RRR no MRG, S1 S2  ABD: SNTND BS+  EXT: no c/c/e, pulses symmetric 2+ bilaterally  NEURO:  CNs II-XII intact, no focal deficits    Discharge Disposition  Home or Self Care    Discharge Medications     Discharge Medications      Changes to Medications      Instructions Start Date   propranolol 10 MG tablet  Commonly known as:  INDERAL  What changed:  medication strength   60 mg, Oral, 3 Times Daily         Continue These Medications      Instructions Start Date   ALPRAZolam 1 MG tablet  Commonly known as:  XANAX   1 mg, Oral, 2 Times Daily PRN      atorvastatin 20 MG tablet  Commonly known as:  LIPITOR   20 mg, Oral, Daily      busPIRone 15 MG tablet  Commonly known as:  BUSPAR   15 mg, Oral, 3 Times Daily      folic acid-vit B6-vit B12 0.8-10-0.115 MG tablet tablet  Commonly known as:  FOLTABS   Oral, Daily      memantine 10 MG tablet  Commonly known as:  NAMENDA   10 mg, Oral, 2 Times Daily      Multiple Vitamin tablet   1 tablet, Oral, Daily             Discharge Diet: prior to admission diet    Activity at Discharge:  as tolerated    Follow-up Information     Zee Daley MD Follow up.  "   Specialty:  Internal Medicine  Contact information:  [address] [place] [id number]  [phone]                   Total time spent discharging patient including evaluation, medication reconciliation, arranging follow up, and post hospitalization instructions and education total time exceeds 30 minutes.    Signed:  Cortez Draper MD  8/18/2019  2:42 PM

## 2019-08-18 NOTE — PLAN OF CARE
Problem: Restraint, Nonbehavioral (Nonviolent)  Goal: Rationale and Justification  Outcome: Outcome(s) achieved Date Met: 08/18/19

## 2019-08-19 ENCOUNTER — READMISSION MANAGEMENT (OUTPATIENT)
Dept: CALL CENTER | Facility: HOSPITAL | Age: 69
End: 2019-08-19

## 2019-08-20 ENCOUNTER — READMISSION MANAGEMENT (OUTPATIENT)
Dept: CALL CENTER | Facility: HOSPITAL | Age: 69
End: 2019-08-20

## 2019-08-20 NOTE — OUTREACH NOTE
Medical Week 1 Survey      Responses   Facility patient discharged from?  [place]   Does the patient have one of the following disease processes/diagnoses(primary or secondary)?  Other   Is there a successful TCM telephone encounter documented?  No   Week 1 attempt successful?  Yes   Call start time  0819   Revoke  Decline to participate   Call end time  0819          Vanessa Grey RN

## 2019-08-20 NOTE — OUTREACH NOTE
Prep Survey      Responses   Facility patient discharged from?  [place]   Is patient eligible?  Yes   Discharge diagnosis  bradycardia d/t accidental overdose of beta blockers   Does the patient have one of the following disease processes/diagnoses(primary or secondary)?  Other   Does the patient have Home health ordered?  No   Is there a DME ordered?  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  Yes          Sandy Oscar RN

## 2020-01-27 ENCOUNTER — LAB REQUISITION (OUTPATIENT)
Dept: LAB | Facility: HOSPITAL | Age: 70
End: 2020-01-27

## 2020-01-27 DIAGNOSIS — Z00.00 ROUTINE GENERAL MEDICAL EXAMINATION AT A HEALTH CARE FACILITY: ICD-10-CM

## 2020-01-27 LAB
BACTERIA UR QL AUTO: ABNORMAL /HPF
BILIRUB UR QL STRIP: NEGATIVE
CLARITY UR: CLEAR
COLOR UR: YELLOW
GLUCOSE UR STRIP-MCNC: NEGATIVE MG/DL
HGB UR QL STRIP.AUTO: NEGATIVE
HYALINE CASTS UR QL AUTO: ABNORMAL /LPF
KETONES UR QL STRIP: NEGATIVE
LEUKOCYTE ESTERASE UR QL STRIP.AUTO: ABNORMAL
NITRITE UR QL STRIP: POSITIVE
PH UR STRIP.AUTO: 5.5 [PH] (ref 5–8)
PROT UR QL STRIP: NEGATIVE
RBC # UR: ABNORMAL /HPF
REF LAB TEST METHOD: ABNORMAL
SP GR UR STRIP: 1.02 (ref 1–1.03)
SQUAMOUS #/AREA URNS HPF: ABNORMAL /HPF
UROBILINOGEN UR QL STRIP: ABNORMAL
WBC UR QL AUTO: ABNORMAL /HPF

## 2020-01-27 PROCEDURE — 81001 URINALYSIS AUTO W/SCOPE: CPT

## 2020-06-09 ENCOUNTER — LAB REQUISITION (OUTPATIENT)
Dept: LAB | Facility: HOSPITAL | Age: 70
End: 2020-06-09

## 2020-06-09 DIAGNOSIS — Z00.00 ROUTINE GENERAL MEDICAL EXAMINATION AT A HEALTH CARE FACILITY: ICD-10-CM

## 2020-06-09 LAB
BILIRUB UR QL STRIP: NEGATIVE
CLARITY UR: ABNORMAL
COLOR UR: YELLOW
GLUCOSE UR STRIP-MCNC: NEGATIVE MG/DL
HGB UR QL STRIP.AUTO: NEGATIVE
KETONES UR QL STRIP: ABNORMAL
LEUKOCYTE ESTERASE UR QL STRIP.AUTO: NEGATIVE
NITRITE UR QL STRIP: NEGATIVE
PH UR STRIP.AUTO: <=5 [PH] (ref 5–8)
PROT UR QL STRIP: NEGATIVE
SP GR UR STRIP: 1.03 (ref 1–1.03)
UROBILINOGEN UR QL STRIP: ABNORMAL

## 2020-06-09 PROCEDURE — 81003 URINALYSIS AUTO W/O SCOPE: CPT

## 2021-03-14 ENCOUNTER — ANESTHESIA (OUTPATIENT)
Dept: PERIOP | Facility: HOSPITAL | Age: 71
End: 2021-03-14

## 2021-03-14 ENCOUNTER — HOSPITAL ENCOUNTER (INPATIENT)
Facility: HOSPITAL | Age: 71
LOS: 3 days | Discharge: NURSING, REHAB OR LONG-TERM CARE | End: 2021-03-17
Attending: EMERGENCY MEDICINE | Admitting: ORTHOPAEDIC SURGERY

## 2021-03-14 ENCOUNTER — ANESTHESIA EVENT (OUTPATIENT)
Dept: PERIOP | Facility: HOSPITAL | Age: 71
End: 2021-03-14

## 2021-03-14 ENCOUNTER — APPOINTMENT (OUTPATIENT)
Dept: GENERAL RADIOLOGY | Facility: HOSPITAL | Age: 71
End: 2021-03-14

## 2021-03-14 DIAGNOSIS — S72.141A CLOSED DISPLACED INTERTROCHANTERIC FRACTURE OF RIGHT FEMUR, INITIAL ENCOUNTER (HCC): Primary | ICD-10-CM

## 2021-03-14 PROBLEM — I10 ESSENTIAL HYPERTENSION: Status: ACTIVE | Noted: 2021-03-14

## 2021-03-14 PROBLEM — E78.5 HYPERLIPIDEMIA: Status: ACTIVE | Noted: 2021-03-14

## 2021-03-14 PROBLEM — Z66 DNR (DO NOT RESUSCITATE): Status: ACTIVE | Noted: 2021-03-14

## 2021-03-14 LAB
ABO GROUP BLD: NORMAL
ALBUMIN SERPL-MCNC: 3.4 G/DL (ref 3.5–5.2)
ALBUMIN/GLOB SERPL: 1.1 G/DL
ALP SERPL-CCNC: 69 U/L (ref 39–117)
ALT SERPL W P-5'-P-CCNC: 14 U/L (ref 1–33)
ANION GAP SERPL CALCULATED.3IONS-SCNC: 5.4 MMOL/L (ref 5–15)
AST SERPL-CCNC: 21 U/L (ref 1–32)
BASOPHILS # BLD AUTO: 0.03 10*3/MM3 (ref 0–0.2)
BASOPHILS NFR BLD AUTO: 0.3 % (ref 0–1.5)
BILIRUB SERPL-MCNC: 1.1 MG/DL (ref 0–1.2)
BLD GP AB SCN SERPL QL: NEGATIVE
BUN SERPL-MCNC: 14 MG/DL (ref 8–23)
BUN/CREAT SERPL: 24.1 (ref 7–25)
CALCIUM SPEC-SCNC: 8.4 MG/DL (ref 8.6–10.5)
CHLORIDE SERPL-SCNC: 102 MMOL/L (ref 98–107)
CO2 SERPL-SCNC: 32.6 MMOL/L (ref 22–29)
CREAT SERPL-MCNC: 0.58 MG/DL (ref 0.57–1)
DEPRECATED RDW RBC AUTO: 49.9 FL (ref 37–54)
EOSINOPHIL # BLD AUTO: 0 10*3/MM3 (ref 0–0.4)
EOSINOPHIL NFR BLD AUTO: 0 % (ref 0.3–6.2)
ERYTHROCYTE [DISTWIDTH] IN BLOOD BY AUTOMATED COUNT: 13.3 % (ref 12.3–15.4)
GFR SERPL CREATININE-BSD FRML MDRD: 102 ML/MIN/1.73
GLOBULIN UR ELPH-MCNC: 3.1 GM/DL
GLUCOSE BLDC GLUCOMTR-MCNC: 91 MG/DL (ref 70–130)
GLUCOSE SERPL-MCNC: 97 MG/DL (ref 65–99)
HCT VFR BLD AUTO: 39.9 % (ref 34–46.6)
HGB BLD-MCNC: 13.2 G/DL (ref 12–15.9)
IMM GRANULOCYTES # BLD AUTO: 0.05 10*3/MM3 (ref 0–0.05)
IMM GRANULOCYTES NFR BLD AUTO: 0.4 % (ref 0–0.5)
LYMPHOCYTES # BLD AUTO: 0.84 10*3/MM3 (ref 0.7–3.1)
LYMPHOCYTES NFR BLD AUTO: 7.4 % (ref 19.6–45.3)
MCH RBC QN AUTO: 33.4 PG (ref 26.6–33)
MCHC RBC AUTO-ENTMCNC: 33.1 G/DL (ref 31.5–35.7)
MCV RBC AUTO: 101 FL (ref 79–97)
MONOCYTES # BLD AUTO: 1.4 10*3/MM3 (ref 0.1–0.9)
MONOCYTES NFR BLD AUTO: 12.3 % (ref 5–12)
NEUTROPHILS NFR BLD AUTO: 79.6 % (ref 42.7–76)
NEUTROPHILS NFR BLD AUTO: 9.07 10*3/MM3 (ref 1.7–7)
NRBC BLD AUTO-RTO: 0 /100 WBC (ref 0–0.2)
PLATELET # BLD AUTO: 179 10*3/MM3 (ref 140–450)
PMV BLD AUTO: 10 FL (ref 6–12)
POTASSIUM SERPL-SCNC: 4.2 MMOL/L (ref 3.5–5.2)
PROT SERPL-MCNC: 6.5 G/DL (ref 6–8.5)
QT INTERVAL: 407 MS
RBC # BLD AUTO: 3.95 10*6/MM3 (ref 3.77–5.28)
RH BLD: POSITIVE
SARS-COV-2 RNA RESP QL NAA+PROBE: NOT DETECTED
SODIUM SERPL-SCNC: 140 MMOL/L (ref 136–145)
T&S EXPIRATION DATE: NORMAL
WBC # BLD AUTO: 11.39 10*3/MM3 (ref 3.4–10.8)
WHOLE BLOOD HOLD SPECIMEN: NORMAL

## 2021-03-14 PROCEDURE — C1769 GUIDE WIRE: HCPCS | Performed by: ORTHOPAEDIC SURGERY

## 2021-03-14 PROCEDURE — 25010000003 CEFAZOLIN IN DEXTROSE 2-4 GM/100ML-% SOLUTION: Performed by: ORTHOPAEDIC SURGERY

## 2021-03-14 PROCEDURE — 73502 X-RAY EXAM HIP UNI 2-3 VIEWS: CPT

## 2021-03-14 PROCEDURE — 80053 COMPREHEN METABOLIC PANEL: CPT | Performed by: EMERGENCY MEDICINE

## 2021-03-14 PROCEDURE — 25010000003 LIDOCAINE 1 % SOLUTION 20 ML VIAL: Performed by: ORTHOPAEDIC SURGERY

## 2021-03-14 PROCEDURE — 25010000002 PROPOFOL 10 MG/ML EMULSION: Performed by: NURSE ANESTHETIST, CERTIFIED REGISTERED

## 2021-03-14 PROCEDURE — 82962 GLUCOSE BLOOD TEST: CPT

## 2021-03-14 PROCEDURE — 93010 ELECTROCARDIOGRAM REPORT: CPT | Performed by: INTERNAL MEDICINE

## 2021-03-14 PROCEDURE — 25010000002 FENTANYL CITRATE (PF) 100 MCG/2ML SOLUTION: Performed by: NURSE ANESTHETIST, CERTIFIED REGISTERED

## 2021-03-14 PROCEDURE — C1713 ANCHOR/SCREW BN/BN,TIS/BN: HCPCS | Performed by: ORTHOPAEDIC SURGERY

## 2021-03-14 PROCEDURE — 86900 BLOOD TYPING SEROLOGIC ABO: CPT | Performed by: EMERGENCY MEDICINE

## 2021-03-14 PROCEDURE — 25010000002 PHENYLEPHRINE PER 1 ML: Performed by: NURSE ANESTHETIST, CERTIFIED REGISTERED

## 2021-03-14 PROCEDURE — 25010000002 ONDANSETRON PER 1 MG: Performed by: NURSE ANESTHETIST, CERTIFIED REGISTERED

## 2021-03-14 PROCEDURE — 85025 COMPLETE CBC W/AUTO DIFF WBC: CPT | Performed by: EMERGENCY MEDICINE

## 2021-03-14 PROCEDURE — 93005 ELECTROCARDIOGRAM TRACING: CPT | Performed by: EMERGENCY MEDICINE

## 2021-03-14 PROCEDURE — 73552 X-RAY EXAM OF FEMUR 2/>: CPT

## 2021-03-14 PROCEDURE — 99222 1ST HOSP IP/OBS MODERATE 55: CPT | Performed by: ORTHOPAEDIC SURGERY

## 2021-03-14 PROCEDURE — 76000 FLUOROSCOPY <1 HR PHYS/QHP: CPT

## 2021-03-14 PROCEDURE — 25010000002 DEXAMETHASONE PER 1 MG: Performed by: NURSE ANESTHETIST, CERTIFIED REGISTERED

## 2021-03-14 PROCEDURE — 25010000002 ONDANSETRON PER 1 MG: Performed by: EMERGENCY MEDICINE

## 2021-03-14 PROCEDURE — U0005 INFEC AGEN DETEC AMPLI PROBE: HCPCS | Performed by: EMERGENCY MEDICINE

## 2021-03-14 PROCEDURE — 25010000002 MORPHINE PER 10 MG: Performed by: EMERGENCY MEDICINE

## 2021-03-14 PROCEDURE — 71045 X-RAY EXAM CHEST 1 VIEW: CPT

## 2021-03-14 PROCEDURE — 99285 EMERGENCY DEPT VISIT HI MDM: CPT

## 2021-03-14 PROCEDURE — 86850 RBC ANTIBODY SCREEN: CPT | Performed by: EMERGENCY MEDICINE

## 2021-03-14 PROCEDURE — 27245 TREAT THIGH FRACTURE: CPT | Performed by: ORTHOPAEDIC SURGERY

## 2021-03-14 PROCEDURE — 0QS606Z REPOSITION RIGHT UPPER FEMUR WITH INTRAMEDULLARY INTERNAL FIXATION DEVICE, OPEN APPROACH: ICD-10-PCS | Performed by: ORTHOPAEDIC SURGERY

## 2021-03-14 PROCEDURE — 86901 BLOOD TYPING SEROLOGIC RH(D): CPT | Performed by: EMERGENCY MEDICINE

## 2021-03-14 PROCEDURE — U0003 INFECTIOUS AGENT DETECTION BY NUCLEIC ACID (DNA OR RNA); SEVERE ACUTE RESPIRATORY SYNDROME CORONAVIRUS 2 (SARS-COV-2) (CORONAVIRUS DISEASE [COVID-19]), AMPLIFIED PROBE TECHNIQUE, MAKING USE OF HIGH THROUGHPUT TECHNOLOGIES AS DESCRIBED BY CMS-2020-01-R: HCPCS | Performed by: EMERGENCY MEDICINE

## 2021-03-14 DEVICE — LAG SCREW, TI
Type: IMPLANTABLE DEVICE | Site: FEMUR | Status: FUNCTIONAL
Brand: GAMMA

## 2021-03-14 DEVICE — LOCKING SCREW, FULLY THREADED: Type: IMPLANTABLE DEVICE | Site: FEMUR | Status: FUNCTIONAL

## 2021-03-14 DEVICE — LONG NAIL KIT R1.5, TI, RIGHT
Type: IMPLANTABLE DEVICE | Site: FEMUR | Status: FUNCTIONAL
Brand: GAMMA

## 2021-03-14 RX ORDER — SODIUM CHLORIDE 0.9 % (FLUSH) 0.9 %
10 SYRINGE (ML) INJECTION EVERY 12 HOURS SCHEDULED
Status: DISCONTINUED | OUTPATIENT
Start: 2021-03-14 | End: 2021-03-17 | Stop reason: HOSPADM

## 2021-03-14 RX ORDER — DIVALPROEX SODIUM 125 MG/1
125 CAPSULE, COATED PELLETS ORAL 3 TIMES DAILY
COMMUNITY
Start: 2021-03-15

## 2021-03-14 RX ORDER — HYDROCODONE BITARTRATE AND ACETAMINOPHEN 7.5; 325 MG/1; MG/1
1 TABLET ORAL ONCE AS NEEDED
Status: DISCONTINUED | OUTPATIENT
Start: 2021-03-14 | End: 2021-03-14 | Stop reason: HOSPADM

## 2021-03-14 RX ORDER — CHOLECALCIFEROL (VITAMIN D3) 125 MCG
5 CAPSULE ORAL NIGHTLY PRN
Status: DISCONTINUED | OUTPATIENT
Start: 2021-03-14 | End: 2021-03-17 | Stop reason: HOSPADM

## 2021-03-14 RX ORDER — FENTANYL CITRATE 50 UG/ML
50 INJECTION, SOLUTION INTRAMUSCULAR; INTRAVENOUS
Status: DISCONTINUED | OUTPATIENT
Start: 2021-03-14 | End: 2021-03-14 | Stop reason: HOSPADM

## 2021-03-14 RX ORDER — SODIUM CHLORIDE 9 MG/ML
75 INJECTION, SOLUTION INTRAVENOUS CONTINUOUS
Status: DISCONTINUED | OUTPATIENT
Start: 2021-03-14 | End: 2021-03-17 | Stop reason: HOSPADM

## 2021-03-14 RX ORDER — ACETAMINOPHEN 325 MG/1
650 TABLET ORAL ONCE AS NEEDED
Status: DISCONTINUED | OUTPATIENT
Start: 2021-03-14 | End: 2021-03-14 | Stop reason: HOSPADM

## 2021-03-14 RX ORDER — NALOXONE HCL 0.4 MG/ML
0.4 VIAL (ML) INJECTION
Status: DISCONTINUED | OUTPATIENT
Start: 2021-03-14 | End: 2021-03-17 | Stop reason: HOSPADM

## 2021-03-14 RX ORDER — ONDANSETRON 2 MG/ML
4 INJECTION INTRAMUSCULAR; INTRAVENOUS EVERY 6 HOURS PRN
Status: DISCONTINUED | OUTPATIENT
Start: 2021-03-14 | End: 2021-03-17 | Stop reason: HOSPADM

## 2021-03-14 RX ORDER — KETAMINE HYDROCHLORIDE 10 MG/ML
INJECTION INTRAMUSCULAR; INTRAVENOUS AS NEEDED
Status: DISCONTINUED | OUTPATIENT
Start: 2021-03-14 | End: 2021-03-14 | Stop reason: SURG

## 2021-03-14 RX ORDER — SODIUM CHLORIDE 0.9 % (FLUSH) 0.9 %
10 SYRINGE (ML) INJECTION AS NEEDED
Status: DISCONTINUED | OUTPATIENT
Start: 2021-03-14 | End: 2021-03-17 | Stop reason: HOSPADM

## 2021-03-14 RX ORDER — PROMETHAZINE HYDROCHLORIDE 25 MG/1
25 TABLET ORAL ONCE AS NEEDED
Status: DISCONTINUED | OUTPATIENT
Start: 2021-03-14 | End: 2021-03-14 | Stop reason: HOSPADM

## 2021-03-14 RX ORDER — ONDANSETRON 2 MG/ML
INJECTION INTRAMUSCULAR; INTRAVENOUS AS NEEDED
Status: DISCONTINUED | OUTPATIENT
Start: 2021-03-14 | End: 2021-03-14 | Stop reason: SURG

## 2021-03-14 RX ORDER — DEXAMETHASONE SODIUM PHOSPHATE 10 MG/ML
INJECTION INTRAMUSCULAR; INTRAVENOUS AS NEEDED
Status: DISCONTINUED | OUTPATIENT
Start: 2021-03-14 | End: 2021-03-14 | Stop reason: SURG

## 2021-03-14 RX ORDER — SODIUM CHLORIDE 0.9 % (FLUSH) 0.9 %
3 SYRINGE (ML) INJECTION EVERY 12 HOURS SCHEDULED
Status: DISCONTINUED | OUTPATIENT
Start: 2021-03-14 | End: 2021-03-14 | Stop reason: HOSPADM

## 2021-03-14 RX ORDER — NALOXONE HCL 0.4 MG/ML
0.2 VIAL (ML) INJECTION AS NEEDED
Status: DISCONTINUED | OUTPATIENT
Start: 2021-03-14 | End: 2021-03-14 | Stop reason: HOSPADM

## 2021-03-14 RX ORDER — CALCIUM CARBONATE 200(500)MG
2 TABLET,CHEWABLE ORAL 2 TIMES DAILY PRN
Status: DISCONTINUED | OUTPATIENT
Start: 2021-03-14 | End: 2021-03-17 | Stop reason: HOSPADM

## 2021-03-14 RX ORDER — PROMETHAZINE HYDROCHLORIDE 25 MG/1
25 SUPPOSITORY RECTAL ONCE AS NEEDED
Status: DISCONTINUED | OUTPATIENT
Start: 2021-03-14 | End: 2021-03-14 | Stop reason: HOSPADM

## 2021-03-14 RX ORDER — ACETAMINOPHEN 650 MG/1
650 SUPPOSITORY RECTAL EVERY 4 HOURS PRN
Status: DISCONTINUED | OUTPATIENT
Start: 2021-03-14 | End: 2021-03-17 | Stop reason: HOSPADM

## 2021-03-14 RX ORDER — MORPHINE SULFATE 2 MG/ML
2 INJECTION, SOLUTION INTRAMUSCULAR; INTRAVENOUS ONCE
Status: COMPLETED | OUTPATIENT
Start: 2021-03-14 | End: 2021-03-14

## 2021-03-14 RX ORDER — PROPRANOLOL HYDROCHLORIDE 20 MG/1
60 TABLET ORAL 3 TIMES DAILY
Status: DISCONTINUED | OUTPATIENT
Start: 2021-03-14 | End: 2021-03-15

## 2021-03-14 RX ORDER — HYDROMORPHONE HYDROCHLORIDE 1 MG/ML
0.5 INJECTION, SOLUTION INTRAMUSCULAR; INTRAVENOUS; SUBCUTANEOUS
Status: DISCONTINUED | OUTPATIENT
Start: 2021-03-14 | End: 2021-03-14 | Stop reason: HOSPADM

## 2021-03-14 RX ORDER — HYDROMORPHONE HYDROCHLORIDE 1 MG/ML
0.5 INJECTION, SOLUTION INTRAMUSCULAR; INTRAVENOUS; SUBCUTANEOUS
Status: DISCONTINUED | OUTPATIENT
Start: 2021-03-14 | End: 2021-03-17 | Stop reason: HOSPADM

## 2021-03-14 RX ORDER — LABETALOL HYDROCHLORIDE 5 MG/ML
5 INJECTION, SOLUTION INTRAVENOUS
Status: DISCONTINUED | OUTPATIENT
Start: 2021-03-14 | End: 2021-03-14 | Stop reason: HOSPADM

## 2021-03-14 RX ORDER — ACETAMINOPHEN 160 MG/5ML
650 SOLUTION ORAL EVERY 4 HOURS PRN
Status: DISCONTINUED | OUTPATIENT
Start: 2021-03-14 | End: 2021-03-17 | Stop reason: HOSPADM

## 2021-03-14 RX ORDER — ONDANSETRON 4 MG/1
4 TABLET, FILM COATED ORAL EVERY 6 HOURS PRN
Status: DISCONTINUED | OUTPATIENT
Start: 2021-03-14 | End: 2021-03-17 | Stop reason: HOSPADM

## 2021-03-14 RX ORDER — EPHEDRINE SULFATE 50 MG/ML
5 INJECTION, SOLUTION INTRAVENOUS ONCE AS NEEDED
Status: DISCONTINUED | OUTPATIENT
Start: 2021-03-14 | End: 2021-03-14 | Stop reason: HOSPADM

## 2021-03-14 RX ORDER — CEFAZOLIN SODIUM 2 G/100ML
2 INJECTION, SOLUTION INTRAVENOUS ONCE
Status: CANCELLED | OUTPATIENT
Start: 2021-03-14 | End: 2021-03-14

## 2021-03-14 RX ORDER — DIPHENHYDRAMINE HYDROCHLORIDE 50 MG/ML
12.5 INJECTION INTRAMUSCULAR; INTRAVENOUS
Status: DISCONTINUED | OUTPATIENT
Start: 2021-03-14 | End: 2021-03-14 | Stop reason: HOSPADM

## 2021-03-14 RX ORDER — LIDOCAINE HYDROCHLORIDE 10 MG/ML
0.5 INJECTION, SOLUTION EPIDURAL; INFILTRATION; INTRACAUDAL; PERINEURAL ONCE AS NEEDED
Status: DISCONTINUED | OUTPATIENT
Start: 2021-03-14 | End: 2021-03-14 | Stop reason: HOSPADM

## 2021-03-14 RX ORDER — BUSPIRONE HYDROCHLORIDE 15 MG/1
15 TABLET ORAL 3 TIMES DAILY
Status: DISCONTINUED | OUTPATIENT
Start: 2021-03-14 | End: 2021-03-15

## 2021-03-14 RX ORDER — ALPRAZOLAM 0.5 MG/1
1 TABLET ORAL 2 TIMES DAILY PRN
Status: DISCONTINUED | OUTPATIENT
Start: 2021-03-14 | End: 2021-03-17 | Stop reason: HOSPADM

## 2021-03-14 RX ORDER — AMOXICILLIN 250 MG
2 CAPSULE ORAL 2 TIMES DAILY
Status: DISCONTINUED | OUTPATIENT
Start: 2021-03-14 | End: 2021-03-17 | Stop reason: HOSPADM

## 2021-03-14 RX ORDER — FLUMAZENIL 0.1 MG/ML
0.2 INJECTION INTRAVENOUS AS NEEDED
Status: DISCONTINUED | OUTPATIENT
Start: 2021-03-14 | End: 2021-03-14 | Stop reason: HOSPADM

## 2021-03-14 RX ORDER — BISACODYL 10 MG
10 SUPPOSITORY, RECTAL RECTAL DAILY PRN
Status: DISCONTINUED | OUTPATIENT
Start: 2021-03-14 | End: 2021-03-17 | Stop reason: HOSPADM

## 2021-03-14 RX ORDER — MIRTAZAPINE 15 MG/1
7.5 TABLET, FILM COATED ORAL NIGHTLY
COMMUNITY

## 2021-03-14 RX ORDER — ACETAMINOPHEN 325 MG/1
650 TABLET ORAL EVERY 4 HOURS PRN
Status: DISCONTINUED | OUTPATIENT
Start: 2021-03-14 | End: 2021-03-17 | Stop reason: HOSPADM

## 2021-03-14 RX ORDER — SUCCINYLCHOLINE CHLORIDE 20 MG/ML
INJECTION INTRAMUSCULAR; INTRAVENOUS AS NEEDED
Status: DISCONTINUED | OUTPATIENT
Start: 2021-03-14 | End: 2021-03-14

## 2021-03-14 RX ORDER — HYDROCODONE BITARTRATE AND ACETAMINOPHEN 5; 325 MG/1; MG/1
1 TABLET ORAL EVERY 4 HOURS PRN
Status: DISCONTINUED | OUTPATIENT
Start: 2021-03-14 | End: 2021-03-17 | Stop reason: HOSPADM

## 2021-03-14 RX ORDER — PROPOFOL 10 MG/ML
VIAL (ML) INTRAVENOUS AS NEEDED
Status: DISCONTINUED | OUTPATIENT
Start: 2021-03-14 | End: 2021-03-14 | Stop reason: SURG

## 2021-03-14 RX ORDER — SODIUM CHLORIDE, SODIUM LACTATE, POTASSIUM CHLORIDE, CALCIUM CHLORIDE 600; 310; 30; 20 MG/100ML; MG/100ML; MG/100ML; MG/100ML
9 INJECTION, SOLUTION INTRAVENOUS CONTINUOUS
Status: DISCONTINUED | OUTPATIENT
Start: 2021-03-14 | End: 2021-03-17 | Stop reason: HOSPADM

## 2021-03-14 RX ORDER — ONDANSETRON 2 MG/ML
4 INJECTION INTRAMUSCULAR; INTRAVENOUS ONCE AS NEEDED
Status: DISCONTINUED | OUTPATIENT
Start: 2021-03-14 | End: 2021-03-14 | Stop reason: HOSPADM

## 2021-03-14 RX ORDER — CEFAZOLIN SODIUM 2 G/100ML
2 INJECTION, SOLUTION INTRAVENOUS ONCE
Status: COMPLETED | OUTPATIENT
Start: 2021-03-14 | End: 2021-03-14

## 2021-03-14 RX ORDER — AMOXICILLIN 250 MG
1 CAPSULE ORAL 2 TIMES DAILY
COMMUNITY

## 2021-03-14 RX ORDER — SODIUM CHLORIDE 0.9 % (FLUSH) 0.9 %
3-10 SYRINGE (ML) INJECTION AS NEEDED
Status: DISCONTINUED | OUTPATIENT
Start: 2021-03-14 | End: 2021-03-14 | Stop reason: HOSPADM

## 2021-03-14 RX ORDER — FENTANYL CITRATE 50 UG/ML
INJECTION, SOLUTION INTRAMUSCULAR; INTRAVENOUS AS NEEDED
Status: DISCONTINUED | OUTPATIENT
Start: 2021-03-14 | End: 2021-03-14 | Stop reason: SURG

## 2021-03-14 RX ORDER — ROCURONIUM BROMIDE 10 MG/ML
INJECTION, SOLUTION INTRAVENOUS AS NEEDED
Status: DISCONTINUED | OUTPATIENT
Start: 2021-03-14 | End: 2021-03-14 | Stop reason: SURG

## 2021-03-14 RX ORDER — ONDANSETRON 2 MG/ML
4 INJECTION INTRAMUSCULAR; INTRAVENOUS ONCE
Status: COMPLETED | OUTPATIENT
Start: 2021-03-14 | End: 2021-03-14

## 2021-03-14 RX ORDER — ATORVASTATIN CALCIUM 20 MG/1
20 TABLET, FILM COATED ORAL DAILY
Status: DISCONTINUED | OUTPATIENT
Start: 2021-03-14 | End: 2021-03-15

## 2021-03-14 RX ORDER — DIPHENHYDRAMINE HCL 25 MG
25 CAPSULE ORAL
Status: DISCONTINUED | OUTPATIENT
Start: 2021-03-14 | End: 2021-03-14 | Stop reason: HOSPADM

## 2021-03-14 RX ORDER — BISACODYL 5 MG/1
5 TABLET, DELAYED RELEASE ORAL DAILY PRN
Status: DISCONTINUED | OUTPATIENT
Start: 2021-03-14 | End: 2021-03-17 | Stop reason: HOSPADM

## 2021-03-14 RX ORDER — HYDRALAZINE HYDROCHLORIDE 20 MG/ML
5 INJECTION INTRAMUSCULAR; INTRAVENOUS
Status: DISCONTINUED | OUTPATIENT
Start: 2021-03-14 | End: 2021-03-14 | Stop reason: HOSPADM

## 2021-03-14 RX ORDER — MAGNESIUM HYDROXIDE 1200 MG/15ML
LIQUID ORAL AS NEEDED
Status: DISCONTINUED | OUTPATIENT
Start: 2021-03-14 | End: 2021-03-14 | Stop reason: HOSPADM

## 2021-03-14 RX ORDER — LIDOCAINE HYDROCHLORIDE 20 MG/ML
INJECTION, SOLUTION INFILTRATION; PERINEURAL AS NEEDED
Status: DISCONTINUED | OUTPATIENT
Start: 2021-03-14 | End: 2021-03-14 | Stop reason: SURG

## 2021-03-14 RX ORDER — MEMANTINE HYDROCHLORIDE 10 MG/1
10 TABLET ORAL EVERY 12 HOURS SCHEDULED
Status: DISCONTINUED | OUTPATIENT
Start: 2021-03-14 | End: 2021-03-15

## 2021-03-14 RX ORDER — SERTRALINE HYDROCHLORIDE 25 MG/1
25 TABLET, FILM COATED ORAL NIGHTLY
COMMUNITY
Start: 2021-03-15

## 2021-03-14 RX ADMIN — PHENYLEPHRINE HYDROCHLORIDE 300 MCG: 10 INJECTION INTRAVENOUS at 14:11

## 2021-03-14 RX ADMIN — PHENYLEPHRINE HYDROCHLORIDE 100 MCG: 10 INJECTION INTRAVENOUS at 15:36

## 2021-03-14 RX ADMIN — PHENYLEPHRINE HYDROCHLORIDE 200 MCG: 10 INJECTION INTRAVENOUS at 14:26

## 2021-03-14 RX ADMIN — PHENYLEPHRINE HYDROCHLORIDE 200 MCG: 10 INJECTION INTRAVENOUS at 14:13

## 2021-03-14 RX ADMIN — ONDANSETRON 4 MG: 2 INJECTION INTRAMUSCULAR; INTRAVENOUS at 11:02

## 2021-03-14 RX ADMIN — PHENYLEPHRINE HYDROCHLORIDE 200 MCG: 10 INJECTION INTRAVENOUS at 14:46

## 2021-03-14 RX ADMIN — LIDOCAINE HYDROCHLORIDE 60 MG: 20 INJECTION, SOLUTION INFILTRATION; PERINEURAL at 14:03

## 2021-03-14 RX ADMIN — SODIUM CHLORIDE, POTASSIUM CHLORIDE, SODIUM LACTATE AND CALCIUM CHLORIDE: 600; 310; 30; 20 INJECTION, SOLUTION INTRAVENOUS at 15:03

## 2021-03-14 RX ADMIN — FENTANYL CITRATE 12.5 MCG: 50 INJECTION INTRAMUSCULAR; INTRAVENOUS at 16:09

## 2021-03-14 RX ADMIN — FENTANYL CITRATE 12.5 MCG: 50 INJECTION INTRAMUSCULAR; INTRAVENOUS at 15:45

## 2021-03-14 RX ADMIN — SODIUM CHLORIDE, POTASSIUM CHLORIDE, SODIUM LACTATE AND CALCIUM CHLORIDE 9 ML/HR: 600; 310; 30; 20 INJECTION, SOLUTION INTRAVENOUS at 13:54

## 2021-03-14 RX ADMIN — DEXAMETHASONE SODIUM PHOSPHATE 4 MG: 10 INJECTION INTRAMUSCULAR; INTRAVENOUS at 14:03

## 2021-03-14 RX ADMIN — PHENYLEPHRINE HYDROCHLORIDE 100 MCG: 10 INJECTION INTRAVENOUS at 14:38

## 2021-03-14 RX ADMIN — KETAMINE HYDROCHLORIDE 25 MG: 10 INJECTION INTRAMUSCULAR; INTRAVENOUS at 14:25

## 2021-03-14 RX ADMIN — ROCURONIUM BROMIDE 30 MG: 50 INJECTION INTRAVENOUS at 14:03

## 2021-03-14 RX ADMIN — PROPOFOL 140 MG: 10 INJECTION, EMULSION INTRAVENOUS at 14:03

## 2021-03-14 RX ADMIN — CEFAZOLIN SODIUM 2 G: 2 INJECTION, SOLUTION INTRAVENOUS at 14:13

## 2021-03-14 RX ADMIN — PHENYLEPHRINE HYDROCHLORIDE 100 MCG: 10 INJECTION INTRAVENOUS at 14:33

## 2021-03-14 RX ADMIN — PHENYLEPHRINE HYDROCHLORIDE 100 MCG: 10 INJECTION INTRAVENOUS at 14:23

## 2021-03-14 RX ADMIN — SODIUM CHLORIDE, POTASSIUM CHLORIDE, SODIUM LACTATE AND CALCIUM CHLORIDE 9 ML/HR: 600; 310; 30; 20 INJECTION, SOLUTION INTRAVENOUS at 22:20

## 2021-03-14 RX ADMIN — ONDANSETRON 4 MG: 2 INJECTION INTRAMUSCULAR; INTRAVENOUS at 16:05

## 2021-03-14 RX ADMIN — MORPHINE SULFATE 2 MG: 2 INJECTION, SOLUTION INTRAMUSCULAR; INTRAVENOUS at 11:02

## 2021-03-14 NOTE — CONSULTS
ORTHO CONSULT NOTE    Patient Identification:        Name: Dee Dee Cox  Age: 70 y.o.  Sex: female  :  1950  MRN: [id number]                                                    HPI:        Dee Dee Cox is a 70 y.o. year old female who presented to the ED after after one of her care providers noticed a hip deformity of the right hip.  Patient has dementia and is nonverbal.  X-rays were taken in the ER and showed a right hip intertrochanteric femur fracture.  Orthopedics was consulted for further care.  As noted the patient is nonverbal.  I did speak with the patient's  and told him the injury and the suggested treatment that be  operative intervention, he would like to proceed with this.    Problem List:  Patient Active Problem List   Diagnosis   • Syncope   • Hypernatremia   • Early onset Alzheimer's dementia (CMS/HCC)   • Acute urinary retention   • Overflow incontinence   • Adverse effect of drug or medicament   • Accidental overdose   • Closed displaced intertrochanteric fracture of right femur (CMS/HCC)       Past Medical History:  Past Medical History:   Diagnosis Date   • Dementia    • Hyperlipidemia    • Hypertension    • UTI (urinary tract infection)        Past Surgical History:  No past surgical history on file.    Home Meds:  (Not in a hospital admission)      Current Meds:   Scheduled Meds:   Continuous Infusions:   PRN Meds:.•  [COMPLETED] Insert peripheral IV **AND** sodium chloride    Allergies:  Allergies   Allergen Reactions   • Cyproheptadine Rash     unknown   • Levaquin [Levofloxacin] Rash     unknown       Social History:   Social History     Tobacco Use   • Smoking status: Never Smoker   Substance Use Topics   • Alcohol use: No       Family History:  No family history on file.    Review of Systems:      Negative for fever, chills, nausea, vomiting, chest pain, shortness of breath, headache, dizziness, vision changes, or slurred speech.  All other pertinent positives and negatives  "as noted above in HPI.      Vitals:   Blood pressure (!) 196/99, pulse 78, temperature 98.1 °F (36.7 °C), temperature source Tympanic, resp. rate 18, height 165.1 cm (65\"), weight 55.6 kg (122 lb 8 oz), SpO2 97 %.    I/O:   No intake or output data in the 24 hours ending 03/14/21 1139    Physical Exam:        General - awake, demented, nonverbal  HEENT: atraumatic  Neck: supple  Skin: no rash  Lung: unlabored breathing  CV: palpable dp/pt pulses    right lower extremity:  Extremity held in externally rotated position  Slightly shortened compared to contralateral extremity  Tender to palpation over greater trochanter  Able to dorsiflex ankle and move toes  Pain with any IR/ER of hip  Unable to test ROM due to pain    Distal pulses intact  No calf swelling  Yaima's sign negative  Compartments soft  No signs or symptoms of DVT or compartment syndrome    Exam of the contralateral hip is normal:  No atrophy, erythema, ecchymosis, or gross deformity noted  No tenderness to palpation  Full active range of motion  5/5 strength in hip flexion, abduction, and adduction  Stinchfield and straight leg raise negative  SAIMA negative  Sensation grossly intact to light tough throughout  Skin and distal pulses intact      Labs:      Lab Results (last 24 hours)     Procedure Component Value Units Date/Time    COVID PRE-OP / PRE-PROCEDURE SCREENING ORDER (NO ISOLATION) - Swab, Nasopharynx [[id number]] Collected: 03/14/21 1038    Specimen: Swab from Nasopharynx Updated: 03/14/21 1137    Narrative:      The following orders were created for panel order COVID PRE-OP / PRE-PROCEDURE SCREENING ORDER (NO ISOLATION) - Swab, Nasopharynx.  Procedure                               Abnormality         Status                     ---------                               -----------         ------                     COVID-19, [facility] IN-HOUSE...[[id number]]                      In process                   Please view results for these tests on the " individual orders.    COVID-19,[facility] IN-HOUSE CEPHEID, NP SWAB IN TRANSPORT MEDIA 8-12 HR TAT - Swab, Nasopharynx [[id number]] Collected: 03/14/21 1038    Specimen: Swab from Nasopharynx Updated: 03/14/21 1137    Comprehensive Metabolic Panel [[id number]]  (Abnormal) Collected: 03/14/21 1037    Specimen: Blood Updated: 03/14/21 1112     Glucose 97 mg/dL      BUN 14 mg/dL      Creatinine 0.58 mg/dL      Sodium 140 mmol/L      Potassium 4.2 mmol/L      Chloride 102 mmol/L      CO2 32.6 mmol/L      Calcium 8.4 mg/dL      Total Protein 6.5 g/dL      Albumin 3.40 g/dL      ALT (SGPT) 14 U/L      AST (SGOT) 21 U/L      Alkaline Phosphatase 69 U/L      Total Bilirubin 1.1 mg/dL      eGFR Non African Amer 102 mL/min/1.73      Globulin 3.1 gm/dL      A/G Ratio 1.1 g/dL      BUN/Creatinine Ratio 24.1     Anion Gap 5.4 mmol/L     Narrative:      GFR Normal >60  Chronic Kidney Disease <60  Kidney Failure <15      CBC & Differential [[id number]]  (Abnormal) Collected: 03/14/21 1037    Specimen: Blood Updated: 03/14/21 1049    Narrative:      The following orders were created for panel order CBC & Differential.  Procedure                               Abnormality         Status                     ---------                               -----------         ------                     CBC Auto Differential[[id number]]        Abnormal            Final result                 Please view results for these tests on the individual orders.    CBC Auto Differential [[id number]]  (Abnormal) Collected: 03/14/21 1037    Specimen: Blood Updated: 03/14/21 1049     WBC 11.39 10*3/mm3      RBC 3.95 10*6/mm3      Hemoglobin 13.2 g/dL      Hematocrit 39.9 %      .0 fL      MCH 33.4 pg      MCHC 33.1 g/dL      RDW 13.3 %      RDW-SD 49.9 fl      MPV 10.0 fL      Platelets 179 10*3/mm3      Neutrophil % 79.6 %      Lymphocyte % 7.4 %      Monocyte % 12.3 %      Eosinophil % 0.0 %      Basophil % 0.3 %      Immature Grans % 0.4 %      Neutrophils,  Absolute 9.07 10*3/mm3      Lymphocytes, Absolute 0.84 10*3/mm3      Monocytes, Absolute 1.40 10*3/mm3      Eosinophils, Absolute 0.00 10*3/mm3      Basophils, Absolute 0.03 10*3/mm3      Immature Grans, Absolute 0.05 10*3/mm3      nRBC 0.0 /100 WBC     [facility] Draw [[id number]] Collected: 03/14/21 1037    Specimen: Blood Updated: 03/14/21 1045    Narrative:      The following orders were created for panel order [facility] Draw.  Procedure                               Abnormality         Status                     ---------                               -----------         ------                     Light Blue Top[[id number]]                                   In process                   Please view results for these tests on the individual orders.    Light Blue Top [[id number]] Collected: 03/14/21 1037    Specimen: Blood Updated: 03/14/21 1045          Diagnostic Studies:      AP of the pelvis and lateral of the right hip were reviewed from the ED.  There is an intertrochanteric fracture of the hip with mildcomminution at the fracture site.  The fracture is in significant varus. There are no periosteal reactions or medullary lesions seen.    Prior imaging not available      Assessment:     right  hip intertrochanteric fracture     Plan:      I have discussed the nature of intertrochanteric fractures with the patient and family.  This fracture will require intramedullary fixation.  The surgical procedure will be scheduled once all medical and cardiac clearances have been obtained.    The risks, benefits, and alternatives of hip fracture surgery were discussed extensively.  The risks include but are not limited to infection, DVT, PE, bleeding and blood loss, damage to nerves or blood vessels, malunion or nonunion, dislocation, continued pain, hip stiffness/loss of motion, hardware irritation, and the possibility of future arthrosis of the hip.  The risks of anesthesia including heart attack, stroke, and even death were  discussed.  The typical post-operative course and rehab plan were discussed as well.  Activity restrictions following the surgery were emphasized and the patient expressed understanding.  All the patient's 's questions were answered.  A consent form was signed and placed on the chart.  The patient will receive perioperative antibiotics.  The patient will be placed on DVT prophylaxis after surgery.      Allan Fox MD  3/14/2021

## 2021-03-14 NOTE — ED PROVIDER NOTES
EMERGENCY DEPARTMENT ENCOUNTER    Room Number:  39/39  Date seen:  3/14/2021  PCP: Zee Daley MD  Historian: Patient's family, nursing facility      HPI:  Chief Complaint: Right hip deformity  A complete HPI/ROS/PMH/PSH/SH/FH are unobtainable due to: Dementia  Context: Dee Dee Cox is a 70 y.o. female who presents to the ED from nursing facility for evaluation of right hip deformity that they noticed this morning when getting her up for breakfast.  There is no reported trauma or injury.  She has not had any prior hip replacement.  The nursing facility was concerned that her hip may be dislocated.  Patient is nonverbal at baseline and has a history of Alzheimer's dementia.  She is a retired internist.  She does not appear to be in pain.  Family is present at the bedside to provide some history.            PAST MEDICAL HISTORY  Active Ambulatory Problems     Diagnosis Date Noted   • Syncope 07/11/2019   • Hypernatremia 07/11/2019   • Early onset Alzheimer's dementia (CMS/[facility]) 07/11/2019   • Acute urinary retention 07/11/2019   • Overflow incontinence 07/11/2019   • Adverse effect of drug or medicament 07/12/2019   • Accidental overdose 08/15/2019     Resolved Ambulatory Problems     Diagnosis Date Noted   • No Resolved Ambulatory Problems     Past Medical History:   Diagnosis Date   • Dementia    • Hyperlipidemia    • Hypertension    • UTI (urinary tract infection)          PAST SURGICAL HISTORY  No past surgical history on file.      FAMILY HISTORY  No family history on file.      SOCIAL HISTORY  Social History     Socioeconomic History   • Marital status:      Spouse name: Not on file   • Number of children: Not on file   • Years of education: Not on file   • Highest education level: Not on file   Tobacco Use   • Smoking status: Never Smoker   Substance and Sexual Activity   • Alcohol use: No   • Drug use: No   • Sexual activity: Defer         ALLERGIES  Cyproheptadine and Levaquin  [levofloxacin]        REVIEW OF SYSTEMS  Review of Systems   Unable to complete review of systems due to patient's dementia and being nonverbal at baseline      PHYSICAL EXAM  ED Triage Vitals   Temp Heart Rate Resp BP SpO2   03/14/21 0837 03/14/21 0837 03/14/21 0837 03/14/21 0837 03/14/21 0837   98.1 °F (36.7 °C) 91 18 161/87 97 %      Temp src Heart Rate Source Patient Position BP Location FiO2 (%)   03/14/21 0837 03/14/21 0837 03/14/21 0847 03/14/21 0847 --   Tympanic Monitor Lying Right arm          GENERAL: Awake and alert, no acute distress  HENT: nares patent  EYES: no scleral icterus, pupils 3 mm reactive bilaterally  CV: regular rhythm, normal rate  RESPIRATORY: normal effort, lungs clear to auscultation bilaterally  ABDOMEN: soft, nontender throughout  MUSCULOSKELETAL: The right lower extremity is slightly shortened relative to the left with mild internal rotation.  2+ right DP pulse.  There is also bony prominence present over the right lateral hip.  NEURO: alert, nonverbal, not following commands  PSYCH:  calm  SKIN: warm, dry    Vital signs and nursing notes reviewed.          LAB RESULTS  Recent Results (from the past 24 hour(s))   Comprehensive Metabolic Panel    Collection Time: 03/14/21 10:37 AM    Specimen: Blood   Result Value Ref Range    Glucose 97 65 - 99 mg/dL    BUN 14 8 - 23 mg/dL    Creatinine 0.58 0.57 - 1.00 mg/dL    Sodium 140 136 - 145 mmol/L    Potassium 4.2 3.5 - 5.2 mmol/L    Chloride 102 98 - 107 mmol/L    CO2 32.6 (H) 22.0 - 29.0 mmol/L    Calcium 8.4 (L) 8.6 - 10.5 mg/dL    Total Protein 6.5 6.0 - 8.5 g/dL    Albumin 3.40 (L) 3.50 - 5.20 g/dL    ALT (SGPT) 14 1 - 33 U/L    AST (SGOT) 21 1 - 32 U/L    Alkaline Phosphatase 69 39 - 117 U/L    Total Bilirubin 1.1 0.0 - 1.2 mg/dL    eGFR Non African Amer 102 >60 mL/min/1.73    Globulin 3.1 gm/dL    A/G Ratio 1.1 g/dL    BUN/Creatinine Ratio 24.1 7.0 - 25.0    Anion Gap 5.4 5.0 - 15.0 mmol/L   CBC Auto Differential    Collection Time:  03/14/21 10:37 AM    Specimen: Blood   Result Value Ref Range    WBC 11.39 (H) 3.40 - 10.80 10*3/mm3    RBC 3.95 3.77 - 5.28 10*6/mm3    Hemoglobin 13.2 12.0 - 15.9 g/dL    Hematocrit 39.9 34.0 - 46.6 %    .0 (H) 79.0 - 97.0 fL    MCH 33.4 (H) 26.6 - 33.0 pg    MCHC 33.1 31.5 - 35.7 g/dL    RDW 13.3 12.3 - 15.4 %    RDW-SD 49.9 37.0 - 54.0 fl    MPV 10.0 6.0 - 12.0 fL    Platelets 179 140 - 450 10*3/mm3    Neutrophil % 79.6 (H) 42.7 - 76.0 %    Lymphocyte % 7.4 (L) 19.6 - 45.3 %    Monocyte % 12.3 (H) 5.0 - 12.0 %    Eosinophil % 0.0 (L) 0.3 - 6.2 %    Basophil % 0.3 0.0 - 1.5 %    Immature Grans % 0.4 0.0 - 0.5 %    Neutrophils, Absolute 9.07 (H) 1.70 - 7.00 10*3/mm3    Lymphocytes, Absolute 0.84 0.70 - 3.10 10*3/mm3    Monocytes, Absolute 1.40 (H) 0.10 - 0.90 10*3/mm3    Eosinophils, Absolute 0.00 0.00 - 0.40 10*3/mm3    Basophils, Absolute 0.03 0.00 - 0.20 10*3/mm3    Immature Grans, Absolute 0.05 0.00 - 0.05 10*3/mm3    nRBC 0.0 0.0 - 0.2 /100 WBC   Type & Screen    Collection Time: 03/14/21 10:37 AM    Specimen: Blood   Result Value Ref Range    ABO Type O     RH type Positive     Antibody Screen Negative     T&S Expiration Date 3/17/2021 11:59:59 PM    Light Blue Top    Collection Time: 03/14/21 10:37 AM   Result Value Ref Range    Extra Tube hold for add-on    COVID-19,[facility]-HOUSE CEPHEID, NP SWAB IN TRANSPORT MEDIA 8-12 HR TAT - Swab, Nasopharynx    Collection Time: 03/14/21 10:38 AM    Specimen: Nasopharynx; Swab   Result Value Ref Range    COVID19 Not Detected Not Detected - Ref. Range   ECG 12 Lead    Collection Time: 03/14/21 10:39 AM   Result Value Ref Range    QT Interval 407 ms       Ordered the above labs and reviewed the results.        RADIOLOGY  XR Chest 1 View    Result Date: 3/14/2021  XR CHEST 1 VW-  HISTORY: Female who is 70 years-old,  preoperative evaluation  TECHNIQUE: Frontal view of the chest  COMPARISON: None available  FINDINGS: Heart, mediastinum and pulmonary vasculature  are unremarkable. No focal pulmonary consolidation, pleural effusion, or pneumothorax. No acute osseous process.      No evidence for acute pulmonary process. Follow-up as clinical indications persist.  This report was finalized on 3/14/2021 10:34 AM by Dr. Hunter Arguello M.D.      XR Hip With or Without Pelvis 2 - 3 View Right    Result Date: 3/14/2021  XR HIP W OR WO PELVIS 2-3 VIEW RIGHT-  INDICATIONS: Right hip pain  TECHNIQUE: 5 views including the pelvis and right hip  COMPARISON: None available  FINDINGS:  Angulated, comminuted right intertrochanteric fracture is noted. No other fractures are identified. No dislocation. Hip joint spaces appear preserved. Degenerative changes apparent in the partly included lumbar spine.       Right intertrochanteric fracture.  This report was finalized on 3/14/2021 10:13 AM by Dr. Hunter Arguello M.D.        Ordered the above noted radiological studies. Reviewed by me in PACS.            PROCEDURES  Procedures              MEDICATIONS GIVEN IN ER  Medications   sodium chloride 0.9 % flush 10 mL (has no administration in time range)   sodium chloride 0.9 % flush 10 mL (has no administration in time range)   sodium chloride 0.9 % flush 10 mL (has no administration in time range)   sodium chloride 0.9 % infusion (has no administration in time range)   acetaminophen (TYLENOL) tablet 650 mg (has no administration in time range)     Or   acetaminophen (TYLENOL) 160 MG/5ML solution 650 mg (has no administration in time range)     Or   acetaminophen (TYLENOL) suppository 650 mg (has no administration in time range)   HYDROcodone-acetaminophen (NORCO) 5-325 MG per tablet 1 tablet (has no administration in time range)   HYDROmorphone (DILAUDID) injection 0.5 mg (has no administration in time range)     And   naloxone (NARCAN) injection 0.4 mg (has no administration in time range)   melatonin tablet 5 mg (has no administration in time range)   sennosides-docusate (PERICOLACE)  8.6-50 MG per tablet 2 tablet (has no administration in time range)   bisacodyl (DULCOLAX) EC tablet 5 mg (has no administration in time range)   bisacodyl (DULCOLAX) suppository 10 mg (has no administration in time range)   ondansetron (ZOFRAN) tablet 4 mg (has no administration in time range)     Or   ondansetron (ZOFRAN) injection 4 mg (has no administration in time range)   calcium carbonate (TUMS) chewable tablet 500 mg (200 mg elemental) (has no administration in time range)   morphine injection 2 mg (2 mg Intravenous Given 3/14/21 1102)   ondansetron (ZOFRAN) injection 4 mg (4 mg Intravenous Given 3/14/21 1102)                   MEDICAL DECISION MAKING, PROGRESS, and CONSULTS    All labs have been independently reviewed by me.  All radiology studies have been reviewed by me and discussed with radiologist dictating the report.   EKG's independently viewed and interpreted by me.  Discussion below represents my analysis of pertinent findings related to patient's condition, differential diagnosis, treatment plan and final disposition.    ED Course as of Mar 14 1241   Sun Mar 14, 2021   0944 Plain films reviewed in pacs, comminuted and displaced right intertrochanteric femur fracture.     [JR]   1006 Discussed with Dr. Fox, orthopedics, who will consult and plan for surgery later today.  CCP nurse called SNF and confirmed patient did not eat breakfast today.     [JR]   1043 EKG          EKG time: 1039  Rhythm/Rate: Sinus rhythm, 77  P waves and CA: Short CA  QRS, axis: Normal axis  ST and T waves: No acute ischemic changes    Interpreted Contemporaneously by me, independently viewed  Similar compared to prior 8/15/2019          [JR]   1126 Discussed with Dr. Grubbs, [facility], who agrees to admit to [facility].    [JR]   1241 ALT (SGPT): 14 [JR]   1241 AST (SGOT): 21 [JR]   1241 Sodium: 140 [JR]   1241 Creatinine: 0.58 [JR]   1241 WBC(!): 11.39 [JR]   1241 Hemoglobin: 13.2 [JR]   1241 COVID19: Not Detected [JR]       ED Course User Index  [JR] Joe Aragon MD              I wore a mask, face shield, and gloves during this patient encounter.  Patient also wearing a surgical mask.  Hand hygeine performed before and after seeing the patient.    DIAGNOSIS  Final diagnoses:   Closed displaced intertrochanteric fracture of right femur, initial encounter (CMS/[facility])         DISPOSITION  ADMIT            Latest Documented Vital Signs:  As of 12:41 EDT  BP- 158/75 HR- 74 Temp- 98.1 °F (36.7 °C) (Tympanic) O2 sat- 96%        --    Please note that portions of this were completed with a voice recognition program.          Joe Aragon MD  03/14/21 9593

## 2021-03-14 NOTE — OP NOTE
Orthopaedic Operative Note    Facility: [facility]    Patient: Dee Dee Cox     Medical Record Number: [id number]     YOB: 1950     Dictating Surgeon: Allan Fox MD       Primary Care Physician: Zee Daley MD     Date of Operation: 3/14/2021     PREOPERATIVE DIAGNOSIS:  Right intertrochanteric femur fracture.       POSTOPERATIVE DIAGNOSIS: Right intertrochanteric femur fracture.       PROCEDURE PERFORMED: Cephalomedullary fixation of right intertrochanteric femur fracture      SURGEON: Allan Fox MD      ASSISTANT: Virginia Farfan      ANESTHESIA: General.       SPECIMENS: None.       COMPLICATIONS: None.       ESTIMATED BLOOD LOSS: Less than 50 mL.      IMPLANTS:     Implant Name Type Inv. Item Serial No.  Lot No. LRB No. Used Action   KWIRE [id number] NS STRL - [id number] Implant KWIRE [id number] NS STRL  [employer] [id number] Right 1 Implanted   NAIL FEM MARICEL R1.5 TI 125D [id number] RT STRL - [id number] Implant NAIL FEM MARICEL R1.5 TI 125D [id number] RT STRL  [employer] [id number] Right 1 Implanted   SCRW LAG GAM3 TI 10.5X90MM STRL - [id number] Implant SCRW LAG GAM3 TI 10.5X90MM STRL  [employer] [id number] Right 1 Implanted   SCRW LK FUL/THRD 5X50MM STRL - [id number] Implant SCRW LK FUL/THRD 5X50MM STRL  [employer] [id number] Right 1 Implanted   SCRW LK FUL/THRD 5X40MM STRL - [id number] Implant SCRW LK FUL/THRD 5X40MM STRL  [employer] SU[employer] [id number] Right 1 Implanted           INDICATIONS: The patient is a 70 y.o. female who sustained an intertrochanteric femur fracture. The risks, benefits, and alternatives to cephalomedullary fixation were discussed with the patient in detail including infection, wound healing problems, hematoma, nonunion, malunion, failure of fixation, cutout of the compression screw, positioning related injury or neuropraxia, iatrogenic injury to major nerves or blood vessels potentially resulting in permanent dysfunction or deficits, DVT, PE, and  anesthesia related complications resulting in death or the need for further surgery. The patient's  acknowledged understanding of information and consented to proceed.     DESCRIPTION OF PROCEDURE: The patient and operative site were identified in the preoperative holding area. The surgical site was marked. Following this, the patient was taken to the operating room and placed in the supine position. Adequate general anesthesia was administered and then patient was repositioned on the operating table on the [place] bed. Timeout was taken. Preoperative antibiotics were administered. Following this, the non-operative leg was placed in a well-padded well leg mckeon. The operative leg was placed into traction. A combination of slight traction, adduction, and internal rotation were utilized to perform a closed reduction of the hip. I confirmed this fluoroscopically in both the AP and lateral planes. Once I was satisfied that the hip was sufficiently well reduced, the hip was then prepped and draped in a standard sterile fashion. I cleaned the extremity with an alcohol solution. A Hibiclens scrub was performed and the extremity was prepped with ChloraPrep x2. I allowed those to dry for 3 minutes before the extremity was draped.     Next, an approximately 3 cm incision was fashioned just proximal to the tip of the greater trochanter. I carefully dissected down through skin and subcutaneous tissues. The fascia was split in line. A guide pin was guided down to the tip of the trochanter and then driven down the intramedullary canal to the level of the lesser under direct fluoroscopic guidance in both the AP and lateral planes. Once I had the guide pin in good position, I reamed an opening and then placed the guidewire. This was guided down to the level of the knee. I measured off that. I determined that a 360 mm length nail would be appropriate. I then directed my attention to the reaming process. I reamed up to an 10 mm  where some chatter was achieved. I over-reamed to a 11-1/2 mm and elected to place an 10 x 360 mm nail. An appropriate length and diameter nail was selected for use. This was impacted down into position. The guidewire was removed. I directed my attention to placement of the proximal compression screw. A separate small incision was fashioned along the lateral aspect of the upper thigh. The guide for the pin was abutted directly adjacent to the bone and then the guide pin for the compression screw was carefully guided up into the center-center position of the femoral head taking care to maintain a tip to apex distance of as little as possible in the combined AP and lateral planes.  I then put a derotational wire due to severe comminution of the fracture.  Once we had the guide pin in good position and that was confirmed radiographically, I reamed up into the head and placed the 90 mm compression screw which seemed to get excellent purchase in the bone and seat well. The de-rotation screw was applied and maximally tightened.       Once I had confirmed the positioning of the hardware in both the AP and lateral planes, I then directed my attention to placement of a distal interlock screw using a perfect [place] technique and a small percutaneous incision. The distal interlock screw was drilled, measured, and placed without complication and seemed to get good purchase in the bone. It was confirmed to traverse the nail.      Having completed the placement of the hardware, final images were taken and saved for later review. I was satisfied we had an excellent reduction of the fracture. The hardware appeared to be well-positioned. I therefore directed my attention to closure. Final images were taken and saved.  Local anesthetic was administered.  The wounds were copiously irrigated with sterile saline. The wounds were sequentially closed in a layered fashion using 0 Vicryl and 2-0 Vicryl for the deep tissues and staples for  the skin. Sterile dressings were applied to wound and drapes withdrawn. The patient was awakened, transferred to a standard bed, and taken to the recovery room. The patient tolerated the procedure well. There were no complications. The patient was taken to the recovery room in good condition.      Allna Fox MD    Cc: Zee Daley MD  3/14/2021

## 2021-03-14 NOTE — PLAN OF CARE
Goal Outcome Evaluation:  Plan of Care Reviewed With: spouse  Progress: no change  Outcome Summary: Patient admitted to unit from PACU in stable condition after undergoing R femur IM nail. Patient presented to ER this morning via EMS and was sent to OR prior to admission to nursing unit. Patient with advanced dementia and per  is essentially non verbal at baseline and unable to have purposeful communication; drowsy but arousing to stimulation upon arrival, withdraws from pain. Accumax ordered and patient turned to help prevent skin injury, waffle boots to heels. History/baseline information obtained from  and will reach out to [place] for more information regarding patient's baseline status.

## 2021-03-14 NOTE — ANESTHESIA PREPROCEDURE EVALUATION
Anesthesia Evaluation     Patient summary reviewed and Nursing notes reviewed   no history of anesthetic complications:  NPO Solid Status: > 8 hours  NPO Liquid Status: > 2 hours           Airway   Mallampati: II  TM distance: >3 FB  Neck ROM: full  No difficulty expected  Dental - normal exam     Pulmonary     breath sounds clear to auscultation  Cardiovascular   Exercise tolerance: good (4-7 METS)    ECG reviewed  Rhythm: regular  Rate: normal    (+) hypertension, hyperlipidemia,       Neuro/Psych  (+) dementia,     GI/Hepatic/Renal/Endo      Musculoskeletal         ROS comment: Right hip intertrochanteric femur fracture  Abdominal    Substance History      OB/GYN          Other                        Anesthesia Plan    ASA 3     general     intravenous induction     Anesthetic plan, all risks, benefits, and alternatives have been provided, discussed and informed consent has been obtained with: patient.    Plan discussed with CRNA.

## 2021-03-14 NOTE — PROGRESS NOTES
Confirmed with Kurtis, nurse at [facility], that pt did not eat breakfast this am. Argentina Perales RN

## 2021-03-14 NOTE — ANESTHESIA POSTPROCEDURE EVALUATION
Patient: Dee Dee Cox    Procedure Summary     Date: 03/14/21 Room / Location: [facility] OR [address] [place] [address] [facility]    Anesthesia Start: 1357 Anesthesia Stop: 1629    Procedure: FEMUR INTRAMEDULLARY NAILING/RODDING (Right Thigh) Diagnosis:       Closed displaced intertrochanteric fracture of right femur, initial encounter (CMS/[facility])      (Closed displaced intertrochanteric fracture of right femur, initial encounter (CMS/[facility]) [S72.141A])    Surgeons: Allan Fox MD Provider: Chris Terry MD    Anesthesia Type: general ASA Status: 3          Anesthesia Type: general    Vitals  Vitals Value Taken Time   /81 03/14/21 1650   Temp 36.7 °C (98.1 °F) 03/14/21 1625   Pulse 74 03/14/21 1656   Resp 20 03/14/21 1635   SpO2 100 % 03/14/21 1656   Vitals shown include unvalidated device data.        Post Anesthesia Care and Evaluation    Patient location during evaluation: bedside  Patient participation: complete - patient participated  Level of consciousness: awake and alert  Pain management: adequate  Airway patency: patent  Anesthetic complications: No anesthetic complications  PONV Status: controlled  Cardiovascular status: blood pressure returned to baseline and acceptable  Respiratory status: acceptable  Hydration status: acceptable

## 2021-03-14 NOTE — ED TRIAGE NOTES
All triage performed with this RN wearing appropriate PPE.  Pt placed in mask upon arrival to ED.  Patient to ED from [facility]. They report she is grimacing with movement of her right hip. They deny any injury to the hip. NH reported that patient does fall a lot but they denied a fall this time. She has advanced demetia and is non-verbal at baseline.

## 2021-03-14 NOTE — H&P
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 where she lives after staff there discovered a right hip deformity this AM. There had been no apparent trauma. She is found to have a right intertrochanteric femur fracture.  She is normally under [facility] care.  Her  is at bedside and he states that other than her dementia she is reasonable physically healthy and ambulatory and he would like to try and have the fracture repaired to see if we can get her ambulatory again.     History of Present Illness  Review of Systems   Unable to perform ROS: Dementia        Personal History     Past Medical History:   Diagnosis Date   • Dementia (CMS/HCC)    • Hyperlipidemia    • Hypertension    • UTI (urinary tract infection)      History reviewed. No pertinent surgical history.  History reviewed. No pertinent family history.  Social History     Tobacco Use   • Smoking status: Never Smoker   Substance Use Topics   • Alcohol use: No   • Drug use: No     No current facility-administered medications on file prior to encounter.     Current Outpatient Medications on File Prior to Encounter   Medication Sig Dispense Refill   • ALPRAZolam (XANAX) 1 MG tablet Take 1 mg by mouth 2 (Two) Times a Day As Needed for Anxiety (pt takes 0.5 mg PO in the afternoo and at bedtime).     • atorvastatin (LIPITOR) 20 MG tablet Take 20 mg by mouth Daily.     • busPIRone (BUSPAR) 15 MG tablet Take 15 mg by mouth 3 (Three) Times a Day.     • folic acid-vit B6-vit B12 (FOLTABS) 0.8-10-0.115 MG  tablet tablet Take  by mouth Daily.     • memantine (NAMENDA) 10 MG tablet Take 10 mg by mouth 2 (Two) Times a Day.     • Multiple Vitamin tablet Take 1 tablet by mouth Daily.     • propranolol (INDERAL) 10 MG tablet Take 6 tablets by mouth 3 (Three) Times a Day. 90 tablet 0     Allergies   Allergen Reactions   • Cyproheptadine Rash     unknown   • Levaquin [Levofloxacin] Rash     unknown       Objective    Objective     Vital Signs  Temp:  [98.1 °F (36.7 °C)-98.2 °F (36.8 °C)] 98.2 °F (36.8 °C)  Heart Rate:  [67-98] 87  Resp:  [14-18] 14  BP: (145-196)/(73-99) 165/81  SpO2:  [95 %-97 %] 97 %  on   ;   Device (Oxygen Therapy): room air  Body mass index is 20.39 kg/m².    Physical Exam  Vitals and nursing note reviewed.   Constitutional:       General: She is not in acute distress.     Comments: Appears elderly and frail   HENT:      Head: Normocephalic and atraumatic.      Nose: Nose normal.      Mouth/Throat:      Mouth: Mucous membranes are moist.      Pharynx: Oropharynx is clear.   Eyes:      Extraocular Movements: Extraocular movements intact.      Conjunctiva/sclera: Conjunctivae normal.      Pupils: Pupils are equal, round, and reactive to light.   Cardiovascular:      Rate and Rhythm: Normal rate and regular rhythm.      Pulses: Normal pulses.   Pulmonary:      Effort: Pulmonary effort is normal.      Breath sounds: Normal breath sounds. No rales.   Abdominal:      General: Bowel sounds are normal.      Palpations: Abdomen is soft.      Tenderness: There is no abdominal tenderness.   Musculoskeletal:         General: Tenderness (right hip adn right forearm (multiple blood draws and IV)) present. No swelling.      Cervical back: Neck supple. No rigidity.      Comments: RLE shortened and externally rotated, neurovascularly intact   Skin:     General: Skin is warm and dry.      Capillary Refill: Capillary refill takes less than 2 seconds.   Neurological:      Mental Status: She is alert. She is disoriented.       Comments: Disoriented and non-communicative at baseline. She is able to move all 4 extremities but does not follow commands. She tracks with her eyes and blinks to threat   Psychiatric:         Attention and Perception: She is inattentive.         Speech: She is noncommunicative (baseline per ).         Cognition and Memory: Cognition is impaired.     Results Review:  I reviewed the patient's new clinical results.  I reviewed the patient's new imaging results and agree with the interpretation.  I reviewed the patient's other test results and agree with the interpretation  I personally viewed and interpreted the patient's EKG/Telemetry data  Discussed with ED provider.    Lab Results (last 24 hours)     Procedure Component Value Units Date/Time    CBC & Differential [[id number]]  (Abnormal) Collected: 03/14/21 1037    Specimen: Blood Updated: 03/14/21 1049    Narrative:      The following orders were created for panel order CBC & Differential.  Procedure                               Abnormality         Status                     ---------                               -----------         ------                     CBC Auto Differential[[id number]]        Abnormal            Final result                 Please view results for these tests on the individual orders.    Comprehensive Metabolic Panel [[id number]]  (Abnormal) Collected: 03/14/21 1037    Specimen: Blood Updated: 03/14/21 1112     Glucose 97 mg/dL      BUN 14 mg/dL      Creatinine 0.58 mg/dL      Sodium 140 mmol/L      Potassium 4.2 mmol/L      Chloride 102 mmol/L      CO2 32.6 mmol/L      Calcium 8.4 mg/dL      Total Protein 6.5 g/dL      Albumin 3.40 g/dL      ALT (SGPT) 14 U/L      AST (SGOT) 21 U/L      Alkaline Phosphatase 69 U/L      Total Bilirubin 1.1 mg/dL      eGFR Non African Amer 102 mL/min/1.73      Globulin 3.1 gm/dL      A/G Ratio 1.1 g/dL      BUN/Creatinine Ratio 24.1     Anion Gap 5.4 mmol/L     Narrative:      GFR Normal >60  Chronic  Kidney Disease <60  Kidney Failure <15      CBC Auto Differential [[id number]]  (Abnormal) Collected: 03/14/21 1037    Specimen: Blood Updated: 03/14/21 1049     WBC 11.39 10*3/mm3      RBC 3.95 10*6/mm3      Hemoglobin 13.2 g/dL      Hematocrit 39.9 %      .0 fL      MCH 33.4 pg      MCHC 33.1 g/dL      RDW 13.3 %      RDW-SD 49.9 fl      MPV 10.0 fL      Platelets 179 10*3/mm3      Neutrophil % 79.6 %      Lymphocyte % 7.4 %      Monocyte % 12.3 %      Eosinophil % 0.0 %      Basophil % 0.3 %      Immature Grans % 0.4 %      Neutrophils, Absolute 9.07 10*3/mm3      Lymphocytes, Absolute 0.84 10*3/mm3      Monocytes, Absolute 1.40 10*3/mm3      Eosinophils, Absolute 0.00 10*3/mm3      Basophils, Absolute 0.03 10*3/mm3      Immature Grans, Absolute 0.05 10*3/mm3      nRBC 0.0 /100 WBC     COVID PRE-OP / PRE-PROCEDURE SCREENING ORDER (NO ISOLATION) - Swab, Nasopharynx [[id number]]  (Normal) Collected: 03/14/21 1038    Specimen: Swab from Nasopharynx Updated: 03/14/21 1222    Narrative:      The following orders were created for panel order COVID PRE-OP / PRE-PROCEDURE SCREENING ORDER (NO ISOLATION) - Swab, Nasopharynx.  Procedure                               Abnormality         Status                     ---------                               -----------         ------                     COVID-19,[facility]-HOU[facility]...[[id number]]  Normal              Final result                 Please view results for these tests on the individual orders.    COVID-19,[facility] CEPHEID, NP SWAB IN TRANSPORT MEDIA 8-12 HR TAT - Swab, Nasopharynx [[id number]]  (Normal) Collected: 03/14/21 1038    Specimen: Swab from Nasopharynx Updated: 03/14/21 1222     COVID19 Not Detected    Narrative:      Fact sheet for providers: https://www.fda.gov/media/213439/download     Fact sheet for patients: https://www.fda.gov/media/429962/download          Imaging Results (Last 24 Hours)     Procedure Component Value Units Date/Time    XR Chest  1 View [[id number]] Collected: 03/14/21 1026     Updated: 03/14/21 1037    Narrative:      XR CHEST 1 VW-     HISTORY: Female who is 70 years-old,  preoperative evaluation     TECHNIQUE: Frontal view of the chest     COMPARISON: None available     FINDINGS: Heart, mediastinum and pulmonary vasculature are unremarkable.  No focal pulmonary consolidation, pleural effusion, or pneumothorax. No  acute osseous process.       Impression:      No evidence for acute pulmonary process. Follow-up as  clinical indications persist.     This report was finalized on 3/14/2021 10:34 AM by Dr. Hunter Arguello M.D.       XR Hip With or Without Pelvis 2 - 3 View Right [[id number]] Collected: 03/14/21 1012     Updated: 03/14/21 1016    Narrative:      XR HIP W OR WO PELVIS 2-3 VIEW RIGHT-     INDICATIONS: Right hip pain     TECHNIQUE: 5 views including the pelvis and right hip     COMPARISON: None available     FINDINGS:     Angulated, comminuted right intertrochanteric fracture is noted. No  other fractures are identified. No dislocation. Hip joint spaces appear  preserved. Degenerative changes apparent in the partly included lumbar  spine.       Impression:         Right intertrochanteric fracture.     This report was finalized on 3/14/2021 10:13 AM by Dr. Hunter Arguello M.D.                 ECG 12 Lead   Preliminary Result   HEART RATE= 77  bpm   RR Interval= 784  ms   IL Interval= 107  ms   P Horizontal Axis= 45  deg   P Front Axis= 92  deg   QRSD Interval= 87  ms   QT Interval= 407  ms   QRS Axis= 91  deg   T Wave Axis= 66  deg   - ABNORMAL ECG -   Sinus rhythm   Short IL interval   Right atrial enlargement   Right axis deviation   Minimal ST depression, inferior leads   Minimal ST elevation, anterior leads   Electronically Signed By:    Date and Time of Study: 2021-03-14 10:39:38           Assessment/Plan     Active Hospital Problems    Diagnosis  POA   • Closed displaced intertrochanteric fracture of right femur  (CMS/[facility]) [S72.141A]  Yes   • Essential hypertension [I10]  Yes   • Hyperlipidemia [E78.5]  Yes   • DNR (do not resuscitate) [Z66]  Yes   • Early onset Alzheimer's dementia (CMS/[facility]) [G30.0, F02.80]  Yes      Resolved Hospital Problems   No resolved problems to display.   Right Hip Fracture  - no apparent fall or other trauma at SNF  - keep on bed rest and provide pain control and bowel regimen  - encourage incentive spirometry but may be difficult due to dementia  - orthopedics has evaluated and planning OR today and  is okay with this-will keep her NPO. CXR, ECG, and medical records including previous echocardiogram from 2020 reviewed and there is no medical contraindication for surgery at this point    HTN  - previously on propranolol but no longer taking  - monitor BP    Alzheimer's Dementia  - early onset, advanced  - continue on valproate  - continue remeron and sertraline  - monitor for sundowning and redirect as needed    I discussed the patient's findings and my recommendations with patient, family, nursing staff and ED provider.    VTE Prophylaxis - SCDs.  Code Status - DNR.(excludes surgery, I confirmed with her  at bedside)       Trino Grubbs MD  [facility]  03/14/21  14:15 EDT

## 2021-03-15 LAB
25(OH)D3 SERPL-MCNC: 22.7 NG/ML (ref 30–100)
ANION GAP SERPL CALCULATED.3IONS-SCNC: 6.9 MMOL/L (ref 5–15)
BASOPHILS # BLD AUTO: 0.02 10*3/MM3 (ref 0–0.2)
BASOPHILS NFR BLD AUTO: 0.2 % (ref 0–1.5)
BUN SERPL-MCNC: 17 MG/DL (ref 8–23)
BUN/CREAT SERPL: 32.7 (ref 7–25)
CALCIUM SPEC-SCNC: 7.9 MG/DL (ref 8.6–10.5)
CHLORIDE SERPL-SCNC: 105 MMOL/L (ref 98–107)
CO2 SERPL-SCNC: 27.1 MMOL/L (ref 22–29)
CREAT SERPL-MCNC: 0.52 MG/DL (ref 0.57–1)
DEPRECATED RDW RBC AUTO: 44.7 FL (ref 37–54)
EOSINOPHIL # BLD AUTO: 0 10*3/MM3 (ref 0–0.4)
EOSINOPHIL NFR BLD AUTO: 0 % (ref 0.3–6.2)
ERYTHROCYTE [DISTWIDTH] IN BLOOD BY AUTOMATED COUNT: 12.7 % (ref 12.3–15.4)
GFR SERPL CREATININE-BSD FRML MDRD: 116 ML/MIN/1.73
GLUCOSE SERPL-MCNC: 89 MG/DL (ref 65–99)
HCT VFR BLD AUTO: 29.6 % (ref 34–46.6)
HGB BLD-MCNC: 10.2 G/DL (ref 12–15.9)
IMM GRANULOCYTES # BLD AUTO: 0.06 10*3/MM3 (ref 0–0.05)
IMM GRANULOCYTES NFR BLD AUTO: 0.6 % (ref 0–0.5)
LYMPHOCYTES # BLD AUTO: 1.56 10*3/MM3 (ref 0.7–3.1)
LYMPHOCYTES NFR BLD AUTO: 16.6 % (ref 19.6–45.3)
MCH RBC QN AUTO: 33.6 PG (ref 26.6–33)
MCHC RBC AUTO-ENTMCNC: 34.5 G/DL (ref 31.5–35.7)
MCV RBC AUTO: 97.4 FL (ref 79–97)
MONOCYTES # BLD AUTO: 1.26 10*3/MM3 (ref 0.1–0.9)
MONOCYTES NFR BLD AUTO: 13.4 % (ref 5–12)
NEUTROPHILS NFR BLD AUTO: 6.52 10*3/MM3 (ref 1.7–7)
NEUTROPHILS NFR BLD AUTO: 69.2 % (ref 42.7–76)
NRBC BLD AUTO-RTO: 0 /100 WBC (ref 0–0.2)
PLATELET # BLD AUTO: 160 10*3/MM3 (ref 140–450)
PMV BLD AUTO: 10.6 FL (ref 6–12)
POTASSIUM SERPL-SCNC: 4.4 MMOL/L (ref 3.5–5.2)
RBC # BLD AUTO: 3.04 10*6/MM3 (ref 3.77–5.28)
SODIUM SERPL-SCNC: 139 MMOL/L (ref 136–145)
WBC # BLD AUTO: 9.42 10*3/MM3 (ref 3.4–10.8)

## 2021-03-15 PROCEDURE — 25010000002 ENOXAPARIN PER 10 MG: Performed by: ORTHOPAEDIC SURGERY

## 2021-03-15 PROCEDURE — 82306 VITAMIN D 25 HYDROXY: CPT | Performed by: ORTHOPAEDIC SURGERY

## 2021-03-15 PROCEDURE — 80048 BASIC METABOLIC PNL TOTAL CA: CPT | Performed by: ORTHOPAEDIC SURGERY

## 2021-03-15 PROCEDURE — 99024 POSTOP FOLLOW-UP VISIT: CPT | Performed by: ORTHOPAEDIC SURGERY

## 2021-03-15 PROCEDURE — 97110 THERAPEUTIC EXERCISES: CPT

## 2021-03-15 PROCEDURE — 92610 EVALUATE SWALLOWING FUNCTION: CPT

## 2021-03-15 PROCEDURE — 97162 PT EVAL MOD COMPLEX 30 MIN: CPT

## 2021-03-15 PROCEDURE — 85025 COMPLETE CBC W/AUTO DIFF WBC: CPT | Performed by: ORTHOPAEDIC SURGERY

## 2021-03-15 RX ORDER — DIVALPROEX SODIUM 125 MG/1
125 CAPSULE, COATED PELLETS ORAL 3 TIMES DAILY
Status: DISCONTINUED | OUTPATIENT
Start: 2021-03-15 | End: 2021-03-17 | Stop reason: HOSPADM

## 2021-03-15 RX ORDER — ERGOCALCIFEROL 1.25 MG/1
50000 CAPSULE ORAL
Status: DISCONTINUED | OUTPATIENT
Start: 2021-03-15 | End: 2021-03-17 | Stop reason: HOSPADM

## 2021-03-15 RX ORDER — POLYETHYLENE GLYCOL 3350 17 G/17G
17 POWDER, FOR SOLUTION ORAL NIGHTLY
Status: DISCONTINUED | OUTPATIENT
Start: 2021-03-15 | End: 2021-03-17 | Stop reason: HOSPADM

## 2021-03-15 RX ORDER — POLYETHYLENE GLYCOL 3350 17 G/17G
17 POWDER, FOR SOLUTION ORAL NIGHTLY
COMMUNITY

## 2021-03-15 RX ORDER — SERTRALINE HYDROCHLORIDE 25 MG/1
25 TABLET, FILM COATED ORAL NIGHTLY
Status: DISCONTINUED | OUTPATIENT
Start: 2021-03-15 | End: 2021-03-17 | Stop reason: HOSPADM

## 2021-03-15 RX ORDER — ERGOCALCIFEROL 1.25 MG/1
50000 CAPSULE ORAL
Qty: 3 CAPSULE | Refills: 0
Start: 2021-03-15 | End: 2021-03-30

## 2021-03-15 RX ORDER — MIRTAZAPINE 15 MG/1
7.5 TABLET, FILM COATED ORAL NIGHTLY
Status: DISCONTINUED | OUTPATIENT
Start: 2021-03-15 | End: 2021-03-17 | Stop reason: HOSPADM

## 2021-03-15 RX ADMIN — ENOXAPARIN SODIUM 40 MG: 40 INJECTION SUBCUTANEOUS at 18:32

## 2021-03-15 RX ADMIN — MIRTAZAPINE 7.5 MG: 15 TABLET, FILM COATED ORAL at 21:58

## 2021-03-15 RX ADMIN — SODIUM CHLORIDE 75 ML/HR: 9 INJECTION, SOLUTION INTRAVENOUS at 02:54

## 2021-03-15 RX ADMIN — SERTRALINE 25 MG: 25 TABLET, FILM COATED ORAL at 21:58

## 2021-03-15 RX ADMIN — POLYETHYLENE GLYCOL 3350 17 G: 17 POWDER, FOR SOLUTION ORAL at 21:59

## 2021-03-15 RX ADMIN — DOCUSATE SODIUM 50MG AND SENNOSIDES 8.6MG 2 TABLET: 8.6; 5 TABLET, FILM COATED ORAL at 21:58

## 2021-03-15 RX ADMIN — SODIUM CHLORIDE, PRESERVATIVE FREE 10 ML: 5 INJECTION INTRAVENOUS at 21:59

## 2021-03-15 RX ADMIN — DIVALPROEX SODIUM 125 MG: 125 CAPSULE, COATED PELLETS ORAL at 15:06

## 2021-03-15 RX ADMIN — ERGOCALCIFEROL 50000 UNITS: 1.25 CAPSULE ORAL at 15:06

## 2021-03-15 NOTE — DISCHARGE PLACEMENT REQUEST
"Dee Dee Queen (70 y.o. Female)     Date of Birth Social Security Number Address Home Phone MRN    1950  [place] [address] [phone] [id number]    [employer] Marital Status          None        Admission Date Admission Type Admitting Provider Attending Provider Department, Room/Bed    3/14/21 Emergency Trino Grubbs MD Lykins, Matthew D, MD [address], P899/1    Discharge Date Discharge Disposition Discharge Destination                       Attending Provider: Trino Grubbs MD    Allergies: Cyproheptadine, Levaquin [Levofloxacin]    Isolation: None   Infection: None   Code Status: No CPR    Ht: 165.1 cm (65\")   Wt: 55.6 kg (122 lb 8 oz)    Admission Cmt: None   Principal Problem: None                Active Insurance as of 3/14/2021     Primary Coverage     Payor Plan Insurance Group Employer/Plan Group    MEDICARE MEDICARE A & B      Payor Plan Address Payor Plan Phone Number Payor Plan Fax Number Effective Dates    [address] [phone]  5/1/2013 - None Entered    [facility] [id number]       Subscriber Name Subscriber Birth Date Member ID       DEE DEE QUEEN 1950 [id number]           Secondary Coverage     Payor Plan Insurance Group Employer/Plan Group    [facility] SUPP [id number]     Payor Plan Address Payor Plan Phone Number Payor Plan Fax Number Effective Dates    [address]   12/1/2016 - None Entered    [facility] [id number]       Subscriber Name Subscriber Birth Date Member ID       DEE DEE QUEEN 1950 [id number]                 Emergency Contacts      (Rel.) Home Phone Work Phone Mobile Phone    Huy Perdue (Spouse) [phone] -- --              "

## 2021-03-15 NOTE — THERAPY EVALUATION
Patient Name: Dee Dee Cox  : 1950    MRN: [id number]                              Today's Date: 3/15/2021       Admit Date: 3/14/2021    Visit Dx:     ICD-10-CM ICD-9-CM   1. Closed displaced intertrochanteric fracture of right femur, initial encounter (CMS/HCC)  S72.141A 820.21     Patient Active Problem List   Diagnosis   • Syncope   • Hypernatremia   • Early onset Alzheimer's dementia (CMS/[facility])   • Acute urinary retention   • Overflow incontinence   • Adverse effect of drug or medicament   • Accidental overdose   • Closed displaced intertrochanteric fracture of right femur (CMS/[facility])   • Essential hypertension   • Hyperlipidemia   • DNR (do not resuscitate)     Past Medical History:   Diagnosis Date   • Dementia (CMS/[facility])    • Hyperlipidemia    • Hypertension    • UTI (urinary tract infection)      Past Surgical History:   Procedure Laterality Date   • FEMUR IM NAILING/RODDING Right 3/14/2021    Procedure: FEMUR INTRAMEDULLARY NAILING/RODDING;  Surgeon: Allan Fox MD;  Location: [facility];  Service: Orthopedics;  Laterality: Right;     General Information     Row Name 03/15/21 1202          Physical Therapy Time and Intention    Document Type  evaluation  -EJ     Mode of Treatment  physical therapy  -EJ     Row Name 03/15/21 1202          General Information    Patient Profile Reviewed  yes  -EJ     Prior Level of Function  -- pt is non verbal at baseline due to advanced dementia, apparently pt was ambulatory at [facility]  -EJ     Existing Precautions/Restrictions  (S) fall;partial weight bearing PWB RLE  -EJ     Barriers to Rehab  previous functional deficit;cognitive status;medically complex  -EJ     Row Name 03/15/21 1202          Living Environment    Lives With  -- sunrise ARLEEN, hospice patient  -EJ     Row Name 03/15/21 1202          Cognition    Orientation Status (Cognition)  disoriented to;situation;place;time;unable/difficult to assess  -EJ     Row Name 03/15/21 1202          Safety Issues,  "Functional Mobility    Safety Issues Affecting Function (Mobility)  safety precaution awareness;ability to follow commands  -EJ     Impairments Affecting Function (Mobility)  coordination;endurance/activity tolerance;cognition;pain;postural/trunk control;range of motion (ROM);strength  -EJ     Cognitive Impairments, Mobility Safety/Performance  safety precaution awareness;sequencing abilities;safety precaution follow-through;judgment;insight into deficits/self-awareness  -EJ       User Key  (r) = Recorded By, (t) = Taken By, (c) = Cosigned By    Initials Name Provider Type    Nikkie Mcgrath, PT Physical Therapist        Mobility     Row Name 03/15/21 1204          Bed Mobility    Bed Mobility  supine-sit;sit-supine  -EJ     Supine-Sit [place] (Bed Mobility)  verbal cues;nonverbal cues (demo/gesture);maximum assist (25% patient effort);2 person assist  -EJ     Sit-Supine [place] (Bed Mobility)  verbal cues;nonverbal cues (demo/gesture);2 person assist;maximum assist (25% patient effort)  -EJ     Assistive Device (Bed Mobility)  head of bed elevated;draw sheet  -EJ     Comment (Bed Mobility)  pt grimaces in pain, verbalizes \"no\" with attempt to sit EOB, requires min A x 2 to maintain static sitting balance, posterior lean sitting EOB  -EJ     Row Name 03/15/21 1204          Transfers    Comment (Transfers)  not appropriate to assess  -EJ     Row Name 03/15/21 1204          Bed-Chair Transfer    Bed-Chair [place] (Transfers)  unable to assess  -EJ     Row Name 03/15/21 1204          Sit-Stand Transfer    Sit-Stand [place] (Transfers)  unable to assess  -EJ     Row Name 03/15/21 1204          Mobility    Extremity Weight-bearing Status  right lower extremity  -EJ     Right Lower Extremity (Weight-bearing Status)  (S) partial weight-bearing (PWB)  -EJ       User Key  (r) = Recorded By, (t) = Taken By, (c) = Cosigned By    Initials Name Provider Type    Nikkie Mcgrath, PT Physical " Therapist        Obj/Interventions     Row Name 03/15/21 1205          Range of Motion Comprehensive    Comment, General Range of Motion  difficult to assess due to pain and cognitive status  -EJ     Row Name 03/15/21 1205          Strength Comprehensive (MMT)    Comment, General Manual Muscle Testing (MMT) Assessment  difficult to assess due to pain and cognitive status  -EJ     Row Name 03/15/21 1205          Balance    Balance Assessment  sitting static balance  -EJ     Static Sitting Balance  moderate impairment  -EJ       User Key  (r) = Recorded By, (t) = Taken By, (c) = Cosigned By    Initials Name Provider Type    Nikkie Mcgrath, PT Physical Therapist        Goals/Plan     Row Name 03/15/21 1210          Bed Mobility Goal 1 (PT)    Activity/Assistive Device (Bed Mobility Goal 1, PT)  bed mobility activities, all  -EJ     [place] Level/Cues Needed (Bed Mobility Goal 1, PT)  minimum assist (75% or more patient effort)  -EJ     Time Frame (Bed Mobility Goal 1, PT)  1 week  -EJ     Row Name 03/15/21 1210          Transfer Goal 1 (PT)    Activity/Assistive Device (Transfer Goal 1, PT)  transfers, all;walker, rolling;sit-to-stand/stand-to-sit;bed-to-chair/chair-to-bed  -EJ     [place] Level/Cues Needed (Transfer Goal 1, PT)  minimum assist (75% or more patient effort);2 person assist  -EJ     Time Frame (Transfer Goal 1, PT)  1 week  -EJ     Row Name 03/15/21 1210          Gait Training Goal 1 (PT)    Activity/Assistive Device (Gait Training Goal 1, PT)  gait (walking locomotion);walker, rolling  -EJ     [place] Level (Gait Training Goal 1, PT)  minimum assist (75% or more patient effort);2 person assist  -EJ     Distance (Gait Training Goal 1, PT)  10  -EJ     Time Frame (Gait Training Goal 1, PT)  1 week  -EJ       User Key  (r) = Recorded By, (t) = Taken By, (c) = Cosigned By    Initials Name Provider Type    Nikkie Mcgrath, PT Physical Therapist        Clinical Impression     Row  "Name 03/15/21 1206          Pain    Additional Documentation  Pain Scale: Numbers Pre/Post-Treatment (Group);Pain Scale: FACES Pre/Post-Treatment (Group)  -EJ     Row Name 03/15/21 1206          Pain Scale: FACES Pre/Post-Treatment    Pain: FACES Scale, Pretreatment  2-->hurts little bit  -EJ     Posttreatment Pain Rating  8-->hurts whole lot  -EJ     Pain Location - Side  Right  -EJ     Pain Location - Orientation  lower  -EJ     Pain Location  extremity  -EJ     Row Name 03/15/21 1206          Plan of Care Review    Plan of Care Reviewed With  patient  -EJ     Outcome Summary  Pt seen for PT eval this am. She was admitted to [facility] yesterday with R femur fx. She is now s/p R femur IM nailing and is PWB to [facility]. Pt with severe advanced dementia and is non verbal at baseline. According to notes, pt was ambulatory. Pt lives at [facility] and is under hospice care. Pt currently presents with expected post op pain, weakness, decreased activity tolerance, and overall decreased functional mobility. Pt only able to verbalize \"no\" with attempted movement today. Pt is requiring max A x 2 for bed mobility. She grimaces in pain and exhibits posterior lean sitting EOB. Pt not appropriate to attempt standing this date due to pain and cognitive status. She will likely need rehab at [facility]. WIll continue to see pt for acute PT to maximize safety and strength with mobility.  -EJ     Row Name 03/15/21 1206          Therapy Assessment/Plan (PT)    Patient/Family Therapy Goals Statement (PT)  did not state  -EJ     Rehab Potential (PT)  fair, will monitor progress closely  -EJ     Criteria for Skilled Interventions Met (PT)  yes  -EJ     Row Name 03/15/21 1206          Positioning and Restraints    Pre-Treatment Position  in bed  -EJ     Post Treatment Position  bed  -EJ     In Bed  notified nsg;supine;call light within reach;encouraged to call for assist;exit alarm on  -EJ       User Key  (r) = Recorded By, (t) = Taken By, (c) = Cosigned " By    Initials Name Provider Type     Nikkie Lugo, PT Physical Therapist        Outcome Measures     Row Name 03/15/21 1210          How much help from another person do you currently need...    Turning from your back to your side while in flat bed without using bedrails?  1  -EJ     Moving from lying on back to sitting on the side of a flat bed without bedrails?  1  -EJ     Moving to and from a bed to a chair (including a wheelchair)?  1  -EJ     Standing up from a chair using your arms (e.g., wheelchair, bedside chair)?  1  -EJ     Climbing 3-5 steps with a railing?  1  -EJ     To walk in hospital room?  1  -EJ     AM-PAC 6 Clicks Score (PT)  6  -EJ     Row Name 03/15/21 1210          Functional Assessment    Outcome Measure Options  AM-PAC 6 Clicks Basic Mobility (PT)  -       User Key  (r) = Recorded By, (t) = Taken By, (c) = Cosigned By    Initials Name Provider Type    Nikkie Mcgrath, PT Physical Therapist        Physical Therapy Education                 Title: PT OT SLP Therapies (In Progress)     Topic: Physical Therapy (In Progress)     Point: Mobility training (In Progress)     Learning Progress Summary           Patient Acceptance, E,TB,D, NL by  at 3/15/2021 1210                   Point: Home exercise program (Not Started)     Learner Progress:  Not documented in this visit.          Point: Body mechanics (Not Started)     Learner Progress:  Not documented in this visit.          Point: Precautions (Not Started)     Learner Progress:  Not documented in this visit.                      User Key     Initials Effective Dates Name Provider Type Discipline     04/03/18 -  Nikkie Lugo, PT Physical Therapist PT              PT Recommendation and Plan  Planned Therapy Interventions (PT): bed mobility training, gait training, home exercise program, patient/family education, strengthening, ROM (range of motion), balance training, transfer training  Plan of Care Reviewed With:  "patient  Outcome Summary: Pt seen for PT eval this am. She was admitted to [facility] yesterday with R femur fx. She is now s/p R femur IM nailing and is PWB to [facility]. Pt with severe advanced dementia and is non verbal at baseline. According to notes, pt was ambulatory. Pt lives at [facility] and is under hospice care. Pt currently presents with expected post op pain, weakness, decreased activity tolerance, and overall decreased functional mobility. Pt only able to verbalize \"no\" with attempted movement today. Pt is requiring max A x 2 for bed mobility. She grimaces in pain and exhibits posterior lean sitting EOB. Pt not appropriate to attempt standing this date due to pain and cognitive status. She will likely need rehab at [place]. WIll continue to see pt for acute PT to maximize safety and strength with mobility.     Time Calculation:   PT Charges     Row Name 03/15/21 1211             Time Calculation    Start Time  1136  -EJ      Stop Time  1157  -EJ      Time Calculation (min)  21 min  -EJ      PT Received On  03/15/21  -EJ      PT - Next Appointment  03/16/21  -EJ      PT Goal Re-Cert Due Date  03/22/21  -EJ         Time Calculation- PT    Total Timed Code Minutes- PT  12 minute(s)  -EJ        User Key  (r) = Recorded By, (t) = Taken By, (c) = Cosigned By    Initials Name Provider Type    EJ Nikkie Lugo, PT Physical Therapist        Therapy Charges for Today     Code Description Service Date Service Provider Modifiers Qty    57212532814 HC PT EVAL MOD COMPLEXITY 2 3/15/2021 Nikkie Lugo, PT GP 1    65128104979 HC PT THER PROC EA 15 MIN 3/15/2021 Nikkie Lugo, PT GP 1    89315480853 HC PT THER SUPP EA 15 MIN 3/15/2021 Nikkie Lugo, PT GP 1          PT G-Codes  Outcome Measure Options: AM-PAC 6 Clicks Basic Mobility (PT)  AM-PAC 6 Clicks Score (PT): 6    Nikkie Lugo PT  3/15/2021    "

## 2021-03-15 NOTE — PROGRESS NOTES
Name: Dee Dee Cox ADMIT: 3/14/2021   : 1950  PCP: Zee Daley MD    MRN: [id number] LOS: 1 days   AGE/SEX: 70 y.o. female  ROOM: [facility]     Subjective   Subjective   CC: right hip deformity  No acute events. Patient went to OR and tolerated well yesterday. She is non-verbal at baseline.  Taking PO-had SLP evaluation.     Objective   Objective   Vital Signs  Temp:  [97.3 °F (36.3 °C)-98.8 °F (37.1 °C)] 98.8 °F (37.1 °C)  Heart Rate:  [67-92] 92  Resp:  [16] 16  BP: (112-151)/(61-67) 151/67  SpO2:  [94 %-99 %] 96 %  on   ;   Device (Oxygen Therapy): room air  Body mass index is 20.39 kg/m².  Physical Exam  Vitals and nursing note reviewed.   Constitutional:       General: She is not in acute distress.  HENT:      Head: Normocephalic and atraumatic.      Nose: Nose normal.      Mouth/Throat:      Mouth: Mucous membranes are moist.      Pharynx: Oropharynx is clear.   Eyes:      Conjunctiva/sclera: Conjunctivae normal.      Pupils: Pupils are equal, round, and reactive to light.   Cardiovascular:      Rate and Rhythm: Normal rate and regular rhythm.      Pulses: Normal pulses.   Pulmonary:      Effort: Pulmonary effort is normal.      Breath sounds: Normal breath sounds.   Abdominal:      General: Bowel sounds are normal.      Palpations: Abdomen is soft.      Tenderness: There is no abdominal tenderness.   Musculoskeletal:         General: Swelling (mild, right thigh) present. No tenderness.      Cervical back: Normal range of motion and neck supple.      Comments: Surgical wounds dressed c/d/i   Skin:     General: Skin is warm and dry.      Capillary Refill: Capillary refill takes less than 2 seconds.   Neurological:      General: No focal deficit present.      Mental Status: She is alert. Mental status is at baseline.   Psychiatric:         Attention and Perception: She is inattentive.         Speech: She is noncommunicative.         Cognition and Memory: Cognition is impaired.       Results  Review     I reviewed the patient's new clinical results.  I reviewed the patient's XR right femur  Results from last 7 days   Lab Units 03/15/21  0605 03/14/21  1037   WBC 10*3/mm3 9.42 11.39*   HEMOGLOBIN g/dL 10.2* 13.2   PLATELETS 10*3/mm3 160 179     Results from last 7 days   Lab Units 03/15/21  0605 03/14/21  1037   SODIUM mmol/L 139 140   POTASSIUM mmol/L 4.4 4.2   CHLORIDE mmol/L 105 102   CO2 mmol/L 27.1 32.6*   BUN mg/dL 17 14   CREATININE mg/dL 0.52* 0.58   GLUCOSE mg/dL 89 97   Estimated Creatinine Clearance: 55.8 mL/min (A) (by C-G formula based on SCr of 0.52 mg/dL (L)).  Results from last 7 days   Lab Units 03/14/21  1037   ALBUMIN g/dL 3.40*   BILIRUBIN mg/dL 1.1   ALK PHOS U/L 69   AST (SGOT) U/L 21   ALT (SGPT) U/L 14     Results from last 7 days   Lab Units 03/15/21  0605 03/14/21  1037   CALCIUM mg/dL 7.9* 8.4*   ALBUMIN g/dL  --  3.40*       COVID19   Date Value Ref Range Status   03/14/2021 Not Detected Not Detected - Ref. Range Final     Glucose   Date/Time Value Ref Range Status   03/14/2021 1720 91 70 - 130 mg/dL Final       XR Femur 2 View Bilateral  RIGHT FEMUR X-RAYS     CLINICAL HISTORY: Postop internal fixation of proximal femoral fracture     5 views were obtained.      The images demonstrate internal fixation of a comminuted  intertrochanteric fracture of the proximal right femur with an  intramedullary grady with proximal and distal interlocking screws. The  proximal screw is a dynamic screw that extends into the femoral neck and  head. The major fracture fragments appear in anatomic alignment.     This report was finalized on 3/14/2021 4:53 PM by Dr. Kurtis Carmona M.D.     XR Femur 2 View Right  Narrative: XR FEMUR 2 VW RIGHT-, FL C ARM DURING SURGERY-     INDICATIONS: ORIF     TECHNIQUE: FLUOROSCOPIC ASSISTANCE IN THE OPERATING ROOM.     FINDINGS:     6 intraoperative fluoroscopic spot views were obtained and recorded the  PACS for review, revealing grady and screw hardware fixation  of right  intertrochanteric fracture. Please see operative report for full  details.     Fluoroscopy time: 167.7 seconds     Impression:    As described.     This report was finalized on 3/14/2021 4:25 PM by Dr. Hunter Arguello M.D.     FL C Arm During Surgery  Narrative: XR FEMUR 2 VW RIGHT-, FL C ARM DURING SURGERY-     INDICATIONS: ORIF     TECHNIQUE: FLUOROSCOPIC ASSISTANCE IN THE OPERATING ROOM.     FINDINGS:     6 intraoperative fluoroscopic spot views were obtained and recorded the  PACS for review, revealing grady and screw hardware fixation of right  intertrochanteric fracture. Please see operative report for full  details.     Fluoroscopy time: 167.7 seconds     Impression:    As described.     This report was finalized on 3/14/2021 4:25 PM by Dr. Hunter Arguello M.D.     XR Chest 1 View  Narrative: XR CHEST 1 VW-     HISTORY: Female who is 70 years-old,  preoperative evaluation     TECHNIQUE: Frontal view of the chest     COMPARISON: None available     FINDINGS: Heart, mediastinum and pulmonary vasculature are unremarkable.  No focal pulmonary consolidation, pleural effusion, or pneumothorax. No  acute osseous process.     Impression: No evidence for acute pulmonary process. Follow-up as  clinical indications persist.     This report was finalized on 3/14/2021 10:34 AM by Dr. Hunter Arguello M.D.     XR Hip With or Without Pelvis 2 - 3 View Right  Narrative: XR HIP W OR WO PELVIS 2-3 VIEW RIGHT-     INDICATIONS: Right hip pain     TECHNIQUE: 5 views including the pelvis and right hip     COMPARISON: None available     FINDINGS:     Angulated, comminuted right intertrochanteric fracture is noted. No  other fractures are identified. No dislocation. Hip joint spaces appear  preserved. Degenerative changes apparent in the partly included lumbar  spine.     Impression:    Right intertrochanteric fracture.     This report was finalized on 3/14/2021 10:13 AM by Dr. Hunter Arguello M.D.        Scheduled Medications  Divalproex Sodium, 125 mg, Oral, TID  enoxaparin, 40 mg, Subcutaneous, Q24H  mirtazapine, 7.5 mg, Oral, Nightly  polyethylene glycol, 17 g, Oral, Nightly  senna-docusate sodium, 2 tablet, Oral, BID  sertraline, 25 mg, Oral, Nightly  sodium chloride, 10 mL, Intravenous, Q12H  sodium chloride, 10 mL, Intravenous, Q12H  vitamin D, 50,000 Units, Oral, Q7 Days    Infusions  lactated ringers, 9 mL/hr, Last Rate: Stopped (03/15/21 0254)  sodium chloride, 75 mL/hr, Last Rate: 75 mL/hr (03/15/21 0254)    Diet  NPO Diet NPO Except: Sips With Meds       Assessment/Plan     Active Hospital Problems    Diagnosis  POA   • Closed displaced intertrochanteric fracture of right femur (CMS/HCC) [S72.141A]  Yes   • Essential hypertension [I10]  Yes   • Hyperlipidemia [E78.5]  Yes   • DNR (do not resuscitate) [Z66]  Yes   • Early onset Alzheimer's dementia (CMS/[facility]) [G30.0, F02.80]  Yes      Resolved Hospital Problems   No resolved problems to display.   Right Hip Fracture  - no apparent fall or other trauma at SNF  - s/p IM nailing 3/14/21  - pain control, PT  - encourage incentive spirometry but may be difficult due to dementia  - appreciate orthopedic surgery recs      HTN  - previously on propranolol but no longer taking  - monitor BP     Alzheimer's Dementia  - early onset, advanced  - continue on valproate  - continue remeron and sertraline  - monitor for sundowning and redirect as needed      Lovenox 40 mg SC daily for DVT prophylaxis.  DNR.  Discussed with patient and nursing staff.  Anticipate discharge TBD timing yet to be determined.      Trino Grubbs MD  [facility]  03/15/21  19:42 EDT

## 2021-03-15 NOTE — PROGRESS NOTES
Continued Stay Note  [facility]     Patient Name: Dee Dee Cox  MRN: [id number]  Today's Date: 3/15/2021    Admit Date: 3/14/2021    Discharge Plan     Row Name 03/15/21 1449       Plan    Plan  SNF -- Referrals Pending.    Patient/Family in Agreement with Plan  yes    Plan Comments  Spoke with Tyesha/[facility] who said they can care for the patient post-op, but their Physical Therapy would be every other day, and she's concerned that they may not be able to rehabilitate the patient to the level the patient's spouse/Huy would like. Updated spouse/Huy who is interested in SNF and requests referrals to both the [facility] and [facility]. Referrals sent in [facility]. Await SNF's determinations.    Row Name 03/15/21 6653       Plan    Plan  SNF    Patient/Family in Agreement with Plan  yes    Plan Comments  Per Epic documentation, the patient has baseline confusion secondary to Alzheimer's Dementia. Therefore CCP spoke with the patient's spouse/Huy, verified current information and explained the role of the CCP. Patient is a resident at [facility]. She's overall dependent on the LT nursing staff to assist with all ADLs. Spouse/Huy said she does not use any DME regularly, and has no history with SNF/HH. Physical Therapy eval/rec today noted. Discussed with spouse/Huy who plans for the patient to d/c back to [facility] if possible. Referral sent in [facility]. If the patient is unable to receive the same physical therapy and nursing care at her LTC that she would at a SNF, then spouse/Huy is agreeable to her discharging to a SNF. Called and left a message for [facility] to discuss. Await their call back. Patient will need an ambulance to transport at d/c.        Discharge Codes    No documentation.             Disha Hearn RN

## 2021-03-15 NOTE — PROGRESS NOTES
Discharge Planning Assessment  [facility]     Patient Name: Dee Dee Cox  MRN: [id number]  Today's Date: 3/15/2021    Admit Date: 3/14/2021    Discharge Needs Assessment     Row Name 03/15/21 1242       Living Environment    Lives With  facility resident    Unique Family Situation  [facility].    Current Living Arrangements  residential facility    Primary Care Provided by  self;other (see comments) Nursing staff at [facility].    Provides Primary Care For  no one, unable/limited ability to care for self    Family Caregiver if Needed  spouse    Quality of Family Relationships  helpful;involved;supportive       Transition Planning    Patient/Family Anticipates Transition to  inpatient rehabilitation facility    Patient/Family Anticipated Services at Transition      Transportation Anticipated  health plan transportation       Discharge Needs Assessment    Readmission Within the Last 30 Days  no previous admission in last 30 days    Equipment Currently Used at Home  none    Discharge Facility/Level of Care Needs  nursing facility, skilled    Provided Post Acute Provider List?  Refused    Refused Provider List Comment  Spouse/Huy would like the patient to return to her LTC if possible.        Discharge Plan     Row Name 03/15/21 1243       Plan    Plan  SNF    Patient/Family in Agreement with Plan  yes    Plan Comments  Per Epic documentation, the patient has baseline confusion secondary to Alzheimer's Dementia. Therefore CCP spoke with the patient's spouse/Huy, verified current information and explained the role of the CCP. Patient is a resident at [facility]. She's overall dependent on the [facility] nursing staff to assist with all ADLs. Spouse/Huy said she does not use any DME regularly, and has no history with SNF/HH. Physical Therapy eval/rec today noted. Discussed with spouse/Huy who plans for the patient to d/c back to [facility] if possible. Referral  sent in Epic. If the patient is unable to receive the same physical therapy and nursing care at her LTC that she would at a SNF, then spouse/Huy is agreeable to her discharging to a SNF. Called and left a message for [facility] to discuss. Await their call back. Patient will need an ambulance to transport at d/c.        Continued Care and Services - Admitted Since 3/14/2021     Destination     Service Provider Request Status Selected Services Address Phone Fax Patient Preferred    [facility]  Pending - Request Sent N/A [address], [facility] [id number] [phone] --                Demographic Summary     Row Name 03/15/21 1241       General Information    Admission Type  inpatient    Reason for Consult  discharge planning    Preferred Language  English     Used During This Interaction  no       Contact Information    Permission Granted to Share Info With  ;family/designee        Functional Status     Row Name 03/15/21 1241       Functional Status    Usual Activity Tolerance  fair    Current Activity Tolerance  fair       Functional Status, IADL    Medications  assistive person    Meal Preparation  assistive person    Housekeeping  assistive person    Laundry  assistive person    Shopping  assistive person       Mental Status Summary    Recent Changes in Mental Status/Cognitive Functioning  other (see comments) H/o Alzheimer's Dementia.        Psychosocial    No documentation.       Abuse/Neglect    No documentation.       Legal    No documentation.       Substance Abuse    No documentation.       Patient Forms    No documentation.           Disha Hearn RN

## 2021-03-15 NOTE — PROGRESS NOTES
Name: Dee Dee Cox ADMIT: 3/14/2021   : 1950  PCP: Zee Daley MD    MRN: [id number] LOS: 1 days   AGE/SEX: 70 y.o. female  ROOM: [facility]     Subjective   Subjective   patient resting in bed, alert, nonverbal at baseline.     Review of Systems   Unable to perform ROS: Dementia        Objective   Objective   Vital Signs  Temp:  [96.9 °F (36.1 °C)-98.1 °F (36.7 °C)] 97.7 °F (36.5 °C)  Heart Rate:  [63-83] 78  Resp:  [16-20] 16  BP: (112-162)/(60-84) 133/67  SpO2:  [94 %-100 %] 98 %  on  Flow (L/min):  [2-6] 2;   Device (Oxygen Therapy): room air  Body mass index is 20.39 kg/m².  Physical Exam  Vitals and nursing note reviewed.   Constitutional:       Appearance: She is ill-appearing.      Comments: nonverbal, elderly, frail   Cardiovascular:      Rate and Rhythm: Normal rate and regular rhythm.   Pulmonary:      Effort: Pulmonary effort is normal. No respiratory distress.      Breath sounds: Normal breath sounds.   Abdominal:      General: Bowel sounds are normal. There is no distension.      Palpations: Abdomen is soft.      Tenderness: There is no abdominal tenderness.   Neurological:      Mental Status: She is alert. Mental status is at baseline. She is disoriented.         Results Review     I reviewed the patient's new clinical results.  Results from last 7 days   Lab Units 03/15/21  0605 21  1037   WBC 10*3/mm3 9.42 11.39*   HEMOGLOBIN g/dL 10.2* 13.2   PLATELETS 10*3/mm3 160 179     Results from last 7 days   Lab Units 03/15/21  0605 21  1037   SODIUM mmol/L 139 140   POTASSIUM mmol/L 4.4 4.2   CHLORIDE mmol/L 105 102   CO2 mmol/L 27.1 32.6*   BUN mg/dL 17 14   CREATININE mg/dL 0.52* 0.58   GLUCOSE mg/dL 89 97   Estimated Creatinine Clearance: 55.8 mL/min (A) (by C-G formula based on SCr of 0.52 mg/dL (L)).  Results from last 7 days   Lab Units 21  1037   ALBUMIN g/dL 3.40*   BILIRUBIN mg/dL 1.1   ALK PHOS U/L 69   AST (SGOT) U/L 21   ALT (SGPT) U/L 14     Results from last  7 days   Lab Units 03/15/21  0605 03/14/21  1037   CALCIUM mg/dL 7.9* 8.4*   ALBUMIN g/dL  --  3.40*       COVID19   Date Value Ref Range Status   03/14/2021 Not Detected Not Detected - Ref. Range Final     Glucose   Date/Time Value Ref Range Status   03/14/2021 1720 91 70 - 130 mg/dL Final       XR Femur 2 View Bilateral  RIGHT FEMUR X-RAYS     CLINICAL HISTORY: Postop internal fixation of proximal femoral fracture     5 views were obtained.      The images demonstrate internal fixation of a comminuted  intertrochanteric fracture of the proximal right femur with an  intramedullary grady with proximal and distal interlocking screws. The  proximal screw is a dynamic screw that extends into the femoral neck and  head. The major fracture fragments appear in anatomic alignment.     This report was finalized on 3/14/2021 4:53 PM by Dr. Kurtis Carmona M.D.     XR Femur 2 View Right  Narrative: XR FEMUR 2 VW RIGHT-, FL C ARM DURING SURGERY-     INDICATIONS: ORIF     TECHNIQUE: FLUOROSCOPIC ASSISTANCE IN THE OPERATING ROOM.     FINDINGS:     6 intraoperative fluoroscopic spot views were obtained and recorded the  PACS for review, revealing grady and screw hardware fixation of right  intertrochanteric fracture. Please see operative report for full  details.     Fluoroscopy time: 167.7 seconds     Impression:    As described.     This report was finalized on 3/14/2021 4:25 PM by Dr. Hunter Arguello M.D.     FL C Arm During Surgery  Narrative: XR FEMUR 2 VW RIGHT-, FL C ARM DURING SURGERY-     INDICATIONS: ORIF     TECHNIQUE: FLUOROSCOPIC ASSISTANCE IN THE OPERATING ROOM.     FINDINGS:     6 intraoperative fluoroscopic spot views were obtained and recorded the  PACS for review, revealing grady and screw hardware fixation of right  intertrochanteric fracture. Please see operative report for full  details.     Fluoroscopy time: 167.7 seconds     Impression:    As described.     This report was finalized on 3/14/2021 4:25 PM by  Dr. Hunter Arguello M.D.     XR Chest 1 View  Narrative: XR CHEST 1 VW-     HISTORY: Female who is 70 years-old,  preoperative evaluation     TECHNIQUE: Frontal view of the chest     COMPARISON: None available     FINDINGS: Heart, mediastinum and pulmonary vasculature are unremarkable.  No focal pulmonary consolidation, pleural effusion, or pneumothorax. No  acute osseous process.     Impression: No evidence for acute pulmonary process. Follow-up as  clinical indications persist.     This report was finalized on 3/14/2021 10:34 AM by Dr. Hunter Arguello M.D.     XR Hip With or Without Pelvis 2 - 3 View Right  Narrative: XR HIP W OR WO PELVIS 2-3 VIEW RIGHT-     INDICATIONS: Right hip pain     TECHNIQUE: 5 views including the pelvis and right hip     COMPARISON: None available     FINDINGS:     Angulated, comminuted right intertrochanteric fracture is noted. No  other fractures are identified. No dislocation. Hip joint spaces appear  preserved. Degenerative changes apparent in the partly included lumbar  spine.     Impression:    Right intertrochanteric fracture.     This report was finalized on 3/14/2021 10:13 AM by Dr. Hunter Arguello M.D.       Scheduled Medications  Divalproex Sodium, 125 mg, Oral, TID  enoxaparin, 40 mg, Subcutaneous, Q24H  mirtazapine, 7.5 mg, Oral, Nightly  polyethylene glycol, 17 g, Oral, Nightly  senna-docusate sodium, 2 tablet, Oral, BID  sertraline, 25 mg, Oral, Nightly  sodium chloride, 10 mL, Intravenous, Q12H  sodium chloride, 10 mL, Intravenous, Q12H  vitamin D, 50,000 Units, Oral, Q7 Days    Infusions  lactated ringers, 9 mL/hr, Last Rate: Stopped (03/15/21 0254)  sodium chloride, 75 mL/hr, Last Rate: 75 mL/hr (03/15/21 0254)    Diet  NPO Diet NPO Except: Sips With Meds       Assessment/Plan     Active Hospital Problems    Diagnosis  POA   • Closed displaced intertrochanteric fracture of right femur (CMS/HCC) [S72.141A]  Yes   • Essential hypertension [I10]  Yes   •  Hyperlipidemia [E78.5]  Yes   • DNR (do not resuscitate) [Z66]  Yes   • Early onset Alzheimer's dementia (CMS/HCC) [G30.0, F02.80]  Yes      Resolved Hospital Problems   No resolved problems to display.       Right Hip Fracture  - no apparent fall or other trauma at SNF  - keep on bed rest and provide pain control and bowel regimen  - encourage incentive spirometry but may be difficult due to dementia  - appreciate ortho, she is s/p Cephalomedullary fixation of right intertrochanteric femur fracture 3/14/21     HTN  - previously on propranolol but no longer taking  - monitor BP     Alzheimer's Dementia  - early onset, advanced  - continue on valproate  - continue remeron and sertraline  - monitor for sundowning and redirect as needed     I discussed the patient's findings and my recommendations with patient, family, nursing staff and ED provider.    · SCDs for DVT prophylaxis.  · DNR.  · Discussed with patient and nursing staff.  · Anticipate discharge 1-2 days      GIANCARLO Ross  [facility]  03/15/21  14:54 EDT    '

## 2021-03-15 NOTE — PROGRESS NOTES
[facility] Visit Report    Dee Dee Cox  [id number]  3/15/2021    Admission R/T [facility] Dx: no     Reason for [facility] Admission:Alzheimer's disease    Symptom  Management: nothing at this time    Nursing/Medication Recommendations:nothing at this time, please call [facility] [phone] with any questions or needs    Psychosocial Issues and Recommendations:    Spiritual Concerns and Recommendations:    [facility] Discharge Plans:  Possibly [facility] or LTC with [facility] continuing to follow     Review of Visit (Include All Collaboration- including names of hospital and family involved during admission/visit):RN arrived at bedside. RN also reviewed Epic notes. Patient is sitting up in bed with her eyes open. Patient answered questions with one word but responses were not appropriate. Patient is showing no signs of distress or discomfort. RN reviewed visit with DYLAN Ortiz. She reports no discharge plans at this time. RN reviewed visit with spouse who states he would like rehab for his wife if she is eligible. He reports there are no plans or plans for discharge. He has no questions, needs or issues at this time. Team will continue to follow to monitor patient's condition and discharge plans.         Connor Phillips RN

## 2021-03-15 NOTE — PLAN OF CARE
Goal Outcome Evaluation:  Plan of Care Reviewed With: patient  Progress: no change  Outcome Summary: Bedside swallow evaluation completed. Pt w/nonsensical speech, cannot follow directions. Bite reflex on straw, and does not open mouth widely for presentation of utensil with absence of bite reflex on spoon (RN reports present earlier). Pt can self feed from cup with bilateral anterior loss. Tongue pumping and mild tongue thrust at rest. With full feed assist, pt consumes puree, soft solids, regular solids. Mastication is adequate with absence of pocketing, oral residue or delay in A-P transport. Palpation with timely initiation and hyo laryngeal elevation appearing adequate. No overt s/s aspiration.     Recommend: regular and thin liquid diet. Medications: 1 at a time with thin liquids or whole in puree as tolerated. Compensations: no straws, full feed assist, small bites/sips, upright for all PO. Will allow treating team to input as orders state NPO d/t orthopedic sx.      SLP to follow for tolerance as appropriate.

## 2021-03-15 NOTE — PROGRESS NOTES
Orthopedic  Progress Note      Patient: Dee Dee Cox  Date of Admission: 3/14/2021  YOB: 1950  Medical Record Number: [id number]    POD # :  1 Day Post-Op Procedure(s) (LRB):  FEMUR INTRAMEDULLARY NAILING/RODDING (Right)    Patient was seen exam this morning.  She is resting well and seen.  As noted prior patient is nonverbal at baseline due to advanced dementia.  No acute distress.  She does attempt to communicate but mumbles her words.    Physical Exam:  70 y.o.  female  Vitals:  Temp:  [96.9 °F (36.1 °C)-98.2 °F (36.8 °C)] 97.4 °F (36.3 °C)  Heart Rate:  [63-98] 83  Resp:  [14-20] 16  BP: (112-196)/(60-99) 116/65  demented    Ext: NV intact. ROM appropriate. Calf is soft and nontender. Negative Homans Sn.  2+ pedal pulses  Skin: Incision clean dry and intact w/out signs or  symptoms of infection.      Weight Bearin% partial weightbearing to right lower extremity with walker    Data Review     Admission on 2021   Component Date Value Ref Range Status   • Glucose 2021 97  65 - 99 mg/dL Final   • BUN 2021 14  8 - 23 mg/dL Final   • Creatinine 2021 0.58  0.57 - 1.00 mg/dL Final   • Sodium 2021 140  136 - 145 mmol/L Final   • Potassium 2021 4.2  3.5 - 5.2 mmol/L Final   • Chloride 2021 102  98 - 107 mmol/L Final   • CO2 2021 32.6* 22.0 - 29.0 mmol/L Final   • Calcium 2021 8.4* 8.6 - 10.5 mg/dL Final   • Total Protein 2021 6.5  6.0 - 8.5 g/dL Final   • Albumin 2021 3.40* 3.50 - 5.20 g/dL Final   • ALT (SGPT) 2021 14  1 - 33 U/L Final   • AST (SGOT) 2021 21  1 - 32 U/L Final   • Alkaline Phosphatase 2021 69  39 - 117 U/L Final   • Total Bilirubin 2021 1.1  0.0 - 1.2 mg/dL Final   • eGFR Non African Amer 2021 102  >60 mL/min/1.73 Final   • Globulin 2021 3.1  gm/dL Final   • A/G Ratio 2021 1.1  g/dL Final   • BUN/Creatinine Ratio 2021 24.1  7.0 - 25.0 Final   • Anion Gap 2021 5.4  5.0  - 15.0 mmol/L Final   • QT Interval 03/14/2021 407  ms Final   • WBC 03/14/2021 11.39* 3.40 - 10.80 10*3/mm3 Final   • RBC 03/14/2021 3.95  3.77 - 5.28 10*6/mm3 Final   • Hemoglobin 03/14/2021 13.2  12.0 - 15.9 g/dL Final   • Hematocrit 03/14/2021 39.9  34.0 - 46.6 % Final   • MCV 03/14/2021 101.0* 79.0 - 97.0 fL Final   • MCH 03/14/2021 33.4* 26.6 - 33.0 pg Final   • MCHC 03/14/2021 33.1  31.5 - 35.7 g/dL Final   • RDW 03/14/2021 13.3  12.3 - 15.4 % Final   • RDW-SD 03/14/2021 49.9  37.0 - 54.0 fl Final   • MPV 03/14/2021 10.0  6.0 - 12.0 fL Final   • Platelets 03/14/2021 179  140 - 450 10*3/mm3 Final   • Neutrophil % 03/14/2021 79.6* 42.7 - 76.0 % Final   • Lymphocyte % 03/14/2021 7.4* 19.6 - 45.3 % Final   • Monocyte % 03/14/2021 12.3* 5.0 - 12.0 % Final   • Eosinophil % 03/14/2021 0.0* 0.3 - 6.2 % Final   • Basophil % 03/14/2021 0.3  0.0 - 1.5 % Final   • Immature Grans % 03/14/2021 0.4  0.0 - 0.5 % Final   • Neutrophils, Absolute 03/14/2021 9.07* 1.70 - 7.00 10*3/mm3 Final   • Lymphocytes, Absolute 03/14/2021 0.84  0.70 - 3.10 10*3/mm3 Final   • Monocytes, Absolute 03/14/2021 1.40* 0.10 - 0.90 10*3/mm3 Final   • Eosinophils, Absolute 03/14/2021 0.00  0.00 - 0.40 10*3/mm3 Final   • Basophils, Absolute 03/14/2021 0.03  0.00 - 0.20 10*3/mm3 Final   • Immature Grans, Absolute 03/14/2021 0.05  0.00 - 0.05 10*3/mm3 Final   • nRBC 03/14/2021 0.0  0.0 - 0.2 /100 WBC Final   • COVID19 03/14/2021 Not Detected  Not Detected - Ref. Range Final   • ABO Type 03/14/2021 O   Final   • RH type 03/14/2021 Positive   Final   • Antibody Screen 03/14/2021 Negative   Final   • T&S Expiration Date 03/14/2021 3/17/2021 11:59:59 PM   Final   • Extra Tube 03/14/2021 hold for add-on   Final    Auto resulted   • Glucose 03/14/2021 91  70 - 130 mg/dL Final   • WBC 03/15/2021 9.42  3.40 - 10.80 10*3/mm3 Final   • RBC 03/15/2021 3.04* 3.77 - 5.28 10*6/mm3 Final   • Hemoglobin 03/15/2021 10.2* 12.0 - 15.9 g/dL Final   • Hematocrit 03/15/2021  29.6* 34.0 - 46.6 % Final   • MCV 03/15/2021 97.4* 79.0 - 97.0 fL Final   • MCH 03/15/2021 33.6* 26.6 - 33.0 pg Final   • MCHC 03/15/2021 34.5  31.5 - 35.7 g/dL Final   • RDW 03/15/2021 12.7  12.3 - 15.4 % Final   • RDW-SD 03/15/2021 44.7  37.0 - 54.0 fl Final   • MPV 03/15/2021 10.6  6.0 - 12.0 fL Final   • Platelets 03/15/2021 160  140 - 450 10*3/mm3 Final   • Neutrophil % 03/15/2021 69.2  42.7 - 76.0 % Final   • Lymphocyte % 03/15/2021 16.6* 19.6 - 45.3 % Final   • Monocyte % 03/15/2021 13.4* 5.0 - 12.0 % Final   • Eosinophil % 03/15/2021 0.0* 0.3 - 6.2 % Final   • Basophil % 03/15/2021 0.2  0.0 - 1.5 % Final   • Immature Grans % 03/15/2021 0.6* 0.0 - 0.5 % Final   • Neutrophils, Absolute 03/15/2021 6.52  1.70 - 7.00 10*3/mm3 Final   • Lymphocytes, Absolute 03/15/2021 1.56  0.70 - 3.10 10*3/mm3 Final   • Monocytes, Absolute 03/15/2021 1.26* 0.10 - 0.90 10*3/mm3 Final   • Eosinophils, Absolute 03/15/2021 0.00  0.00 - 0.40 10*3/mm3 Final   • Basophils, Absolute 03/15/2021 0.02  0.00 - 0.20 10*3/mm3 Final   • Immature Grans, Absolute 03/15/2021 0.06* 0.00 - 0.05 10*3/mm3 Final   • nRBC 03/15/2021 0.0  0.0 - 0.2 /100 WBC Final       No results found.    Medications:  atorvastatin, 20 mg, Oral, Daily  busPIRone, 15 mg, Oral, TID  enoxaparin, 40 mg, Subcutaneous, Q24H  memantine, 10 mg, Oral, Q12H  propranolol, 60 mg, Oral, TID  senna-docusate sodium, 2 tablet, Oral, BID  sodium chloride, 10 mL, Intravenous, Q12H  sodium chloride, 10 mL, Intravenous, Q12H      •  acetaminophen **OR** acetaminophen **OR** acetaminophen  •  ALPRAZolam  •  bisacodyl  •  bisacodyl  •  calcium carbonate  •  HYDROcodone-acetaminophen  •  HYDROmorphone **AND** naloxone  •  melatonin  •  ondansetron **OR** ondansetron  •  [COMPLETED] Insert peripheral IV **AND** sodium chloride  •  sodium chloride  •  sodium chloride      Imaging: RIGHT FEMUR X-RAYS     CLINICAL HISTORY: Postop internal fixation of proximal femoral fracture     5 views were  obtained.      The images demonstrate internal fixation of a comminuted  intertrochanteric fracture of the proximal right femur with an  intramedullary grady with proximal and distal interlocking screws. The  proximal screw is a dynamic screw that extends into the femoral neck and  head. The major fracture fragments appear in anatomic alignment.     This report was finalized on 3/14/2021 4:53 PM by Dr. Kurtis Carmona M.D.      Assessment:  Doing well POD  # 1 Day Post-Op Procedure(s) (LRB):  FEMUR INTRAMEDULLARY NAILING/RODDING (Right)  Problems Addressed this Visit        Musculoskeletal and Injuries    Closed displaced intertrochanteric fracture of right femur (CMS/[facility]) - Primary    Relevant Orders    Case Request (Completed)      Diagnoses       Codes Comments    Closed displaced intertrochanteric fracture of right femur, initial encounter (CMS/[facility])    -  Primary ICD-10-CM: S72.141A  ICD-9-CM: 820.21           Plan:  50% partial weightbearing to right lower extremity with walker  Continue efforts to mobilize  Continue Pain Control Measures  Continue incisional Care  Calcium and vitamin D  DVT prophylaxis: Lovenox for 21 days    Plan to follow-up with me in 2 weeks.  Please call [phone] for appointment.    Allan Fox MD    Date: 3/15/2021  Time: 07:20 EDT

## 2021-03-15 NOTE — THERAPY EVALUATION
Acute Care - Speech Language Pathology   Swallow Initial Evaluation [facility]     Patient Name: Dee Dee Cox  : 1950  MRN: [id number]  Today's Date: 3/15/2021               Admit Date: 3/14/2021    Visit Dx:     ICD-10-CM ICD-9-CM   1. Closed displaced intertrochanteric fracture of right femur, initial encounter (CMS/[facility])  S72.141A 820.21     Patient Active Problem List   Diagnosis   • Syncope   • Hypernatremia   • Early onset Alzheimer's dementia (CMS/[facility])   • Acute urinary retention   • Overflow incontinence   • Adverse effect of drug or medicament   • Accidental overdose   • Closed displaced intertrochanteric fracture of right femur (CMS/[facility])   • Essential hypertension   • Hyperlipidemia   • DNR (do not resuscitate)     Past Medical History:   Diagnosis Date   • Dementia (CMS/[facility])    • Hyperlipidemia    • Hypertension    • UTI (urinary tract infection)      Past Surgical History:   Procedure Laterality Date   • FEMUR IM NAILING/RODDING Right 3/14/2021    Procedure: FEMUR INTRAMEDULLARY NAILING/RODDING;  Surgeon: Allan Fox MD;  Location: [facility];  Service: Orthopedics;  Laterality: Right;        SWALLOW EVALUATION (last 72 hours)      SLP Adult Swallow Evaluation     Row Name 03/15/21 1709       Rehab Evaluation    Document Type  evaluation  -AB    Subjective Information  -- nonverbal   -AB    Patient Observations  alert  -AB    Patient/Family/Caregiver Comments/Observations  Pt with nonsensical verbalizations, speech unintelligible. Phonation quality is adequate, cannot follow commands.   -AB    Patient Effort  fair  -AB    Symptoms Noted During/After Treatment  none  -AB       General Information    Patient Profile Reviewed  yes  -AB    Pertinent History Of Current Problem  70 y.o. female admitted from [facility] w/R hip deformity, underwent femur fixation sx. Pt nonverbal at baseline w/Alzheimers dementia.   -AB    Current Method of Nutrition  NPO  -AB    Precautions/Limitations, Vision  WFL  " -AB    Precautions/Limitations, Hearing  WFL  -AB    Prior Level of Function-Communication  cognitive-linguistic impairment  -AB    Prior Level of Function-Swallowing  regular textures;thin liquids \"finger foods\" per facility  -AB    Barriers to Rehab  previous functional deficit  -AB    Patient's Goals for Discharge  patient could not state  -AB       Pain    Additional Documentation  -- nonverbal   -AB       Oral Motor Structure and Function    Oral Lesions or Structural Abnormalities and/or variants  None  -AB    Dentition Assessment  natural, present and adequate  -AB    Secretion Management  WNL/WFL  -AB    Mucosal Quality  moist, healthy  -AB    Gag Response  -- DNA  -AB    Volitional Swallow  WFL  -AB    Volitional Cough  unable to elicit  -AB       Oral Musculature and Cranial Nerve Assessment    Oral Motor General Assessment  unable to assess doet not follow commands  -AB       Clinical Swallow Eval    Clinical Swallow Evaluation Summary  Bedside swallow evaluation completed. Pt w/nonsensical speech, cannot follow directions. Bite reflex on straw, and does not open mouth widely for presentation of utensil with absence of bite reflex on spoon (RN reports present earlier). Pt can self feed from cup with bilateral anterior loss. Tongue pumping and mild tongue thrust at rest. With full feed assist, pt consumes puree, soft solids, regular solids. Mastication is adequate with absence of pocketing, oral residue or delay in A-P transport. Palpation with timely initiation and hyo laryngeal elevation appearing adequate. No overt s/s aspiration. Recommend: regular and thin liquid diet. Medications: 1 at a time with thin liquids or whole in puree as tolerated. Compensations: no straws, full feed assist, small bites/sips, upright for all PO. SLP to follow for tolerance as appropriate.   -AB       Clinical Impression    SLP Swallowing Diagnosis  other (see comments)  -AB    Functional Impact  risk of " aspiration/pneumonia  -AB    Rehab Potential/Prognosis, Swallowing  re-evaluate goals as necessary  -AB    Swallow Criteria for Skilled Therapeutic Interventions Met  demonstrates skilled criteria  -AB       Recommendations    Therapy Frequency (Swallow)  PRN  -AB    Predicted Duration Therapy Intervention (Days)  until discharge  -AB    SLP Diet Recommendation  regular textures;thin liquids  -AB    Recommended Diagnostics  -- diet tolerance and f/u as indicated  -AB    Recommended Precautions and Strategies  upright posture during/after eating;small bites of food and sips of liquid;no straw;assist with feeding  -AB    Oral Care Recommendations  Oral Care BID/PRN  -AB    SLP Rec. for Method of Medication Administration  as tolerated  -AB    Monitor for Signs of Aspiration  yes;notify SLP if any concerns  -AB    Anticipated Discharge Disposition (SLP)  extended care [facility]  -AB       Swallow Goals (SLP)    Oral Nutrition/Hydration Goal Selection (SLP)  oral nutrition/hydration, SLP goal 1  -AB       Oral Nutrition/Hydration Goal 1 (SLP)    Oral Nutrition/Hydration Goal 1, SLP  Tolerate L/R diet without oral stage difficulties or complications associated with aspiration   -AB    Time Frame (Oral Nutrition/Hydration Goal 1, SLP)  by discharge  -AB      User Key  (r) = Recorded By, (t) = Taken By, (c) = Cosigned By    Initials Name Effective Dates    AB Tamiko Del Valle, MS CCC-SLP 10/05/20 -           EDUCATION  The patient has been educated in the following areas:   Dysphagia (Swallowing Impairment).    SLP Recommendation and Plan  Recommend: regular and thin liquid diet. Medications: 1 at a time with thin liquids or whole in puree as tolerated. Compensations: no straws, full feed assist, small bites/sips, upright for all PO. SLP to follow for tolerance as appropriate.    SLP GOALS     Row Name 03/15/21 1700       Oral Nutrition/Hydration Goal 1 (SLP)    Oral Nutrition/Hydration Goal 1, SLP  Tolerate L/R diet  without oral stage difficulties or complications associated with aspiration   -AB    Time Frame (Oral Nutrition/Hydration Goal 1, SLP)  by discharge  -AB      User Key  (r) = Recorded By, (t) = Taken By, (c) = Cosigned By    Initials Name Provider Type    Tamiko Hinojosa MS CCC-SLP Speech and Language Pathologist           SLP Outcome Measures (last 72 hours)      SLP Outcome Measures     Row Name 03/15/21 1700             SLP Outcome Measures    Outcome Measure Used?  Adult NOMS  -AB         Adult FCM Scores    FCM Chosen  Swallowing  -AB      Swallowing FCM Score  6  -AB        User Key  (r) = Recorded By, (t) = Taken By, (c) = Cosigned By    Initials Name Effective Dates    Tamiko Hinojosa MS CCC-SLP 10/05/20 -            Time Calculation:   Time Calculation- SLP     Row Name 03/15/21 1743             Time Calculation- SLP    SLP Start Time  1700  -AB      SLP Received On  03/15/21  -AB        User Key  (r) = Recorded By, (t) = Taken By, (c) = Cosigned By    Initials Name Provider Type    Tamiko Hinojosa MS CCC-SLP Speech and Language Pathologist          Therapy Charges for Today     Code Description Service Date Service Provider Modifiers Qty    [id number] HC ST EVAL ORAL PHARYNG SWALLOW 3 3/15/2021 Tamiko Del Valle MS CCC-SLP GN 1              PPE:  Patient was not wearing a face mask during this therapy encounter. Therapist used appropriate personal protective equipment including mask, eye protection and gloves.  Mask used was standard procedure mask. Appropriate PPE was worn during the entire therapy session. The patient did not cough during this evaluation. Therapist was within 6 feet for 15 minutes or more during the evaluation. Hand hygiene was completed before and after therapy session. Patient is not in enhanced droplet precautions.         Tamiko Del Valle MS CCC-SLP  3/15/2021

## 2021-03-15 NOTE — PLAN OF CARE
Goal Outcome Evaluation:  Plan of Care Reviewed With: patient  Progress: improving  Outcome Summary: The pt is awake, flat affect, appears calm. Has inability to communicate d/t h/o Dementia. Mumbles words. Incontinent of bowel and bladder. No s/s distress. Noted episodes of bruxism and body stiffness when being turned. Does not appear to be in pain when in laying in bed. S/p Day1 R Hip Intertrochanteric Nailing. Noted 3 dressing on the R hip and lateral knee c/d/i. PIV on the R arm, IV infusing. PIV on the L AC, saline locked. VSS. Appears red on the coccyx, mepilex applied. The pt able to consume 1 cup of mashed maple sweet potatoes and half a cup of water at dinner time, tolerated well, feed w/ assist, pt will not attempt to hold spoon to eat. +UO this shift. Will continue to monitor.     Received report from dayshift nurse that current pts meds does not match the newly faxed list of meds from the pt's home ([facility]). Meds verified w/ nurse Jael ([facility]) that these are current pt's meds: MiraLax, Senna-S, Depakote Sprinkles, Mirtazipine, and Zoloft. Meds input in [facility] for MD to approve.

## 2021-03-15 NOTE — PLAN OF CARE
"Goal Outcome Evaluation:  Plan of Care Reviewed With: patient     Outcome Summary: Pt seen for PT eval this am. She was admitted to [facility] yesterday with R femur fx. She is now s/p R femur IM nailing and is PWB to E. Pt with severe advanced dementia and is non verbal at baseline. According to notes, pt was ambulatory. Pt lives at [facility] and is under hospice care. Pt currently presents with expected post op pain, weakness, decreased activity tolerance, and overall decreased functional mobility. Pt only able to verbalize \"no\" with attempted movement today. Pt is requiring max A x 2 for bed mobility. She grimaces in pain and exhibits posterior lean sitting EOB. Pt not appropriate to attempt standing this date due to pain and cognitive status. She will likely need rehab at [place]. WIll continue to see pt for acute PT to maximize safety and strength with mobility.  Patient was intermittently wearing a face mask during this therapy encounter. Therapist used appropriate personal protective equipment including eye protection, mask, and gloves.  Mask used was standard procedure mask. Appropriate PPE was worn during the entire therapy session. Hand hygiene was completed before and after therapy session. Patient is not in enhanced droplet precautions.     "

## 2021-03-16 LAB
ANION GAP SERPL CALCULATED.3IONS-SCNC: 7.4 MMOL/L (ref 5–15)
BASOPHILS # BLD AUTO: 0.03 10*3/MM3 (ref 0–0.2)
BASOPHILS NFR BLD AUTO: 0.4 % (ref 0–1.5)
BUN SERPL-MCNC: 11 MG/DL (ref 8–23)
BUN/CREAT SERPL: 22 (ref 7–25)
CALCIUM SPEC-SCNC: 7.5 MG/DL (ref 8.6–10.5)
CHLORIDE SERPL-SCNC: 105 MMOL/L (ref 98–107)
CO2 SERPL-SCNC: 25.6 MMOL/L (ref 22–29)
CREAT SERPL-MCNC: 0.5 MG/DL (ref 0.57–1)
DEPRECATED RDW RBC AUTO: 45.2 FL (ref 37–54)
EOSINOPHIL # BLD AUTO: 0.03 10*3/MM3 (ref 0–0.4)
EOSINOPHIL NFR BLD AUTO: 0.4 % (ref 0.3–6.2)
ERYTHROCYTE [DISTWIDTH] IN BLOOD BY AUTOMATED COUNT: 12.9 % (ref 12.3–15.4)
GFR SERPL CREATININE-BSD FRML MDRD: 121 ML/MIN/1.73
GLUCOSE SERPL-MCNC: 76 MG/DL (ref 65–99)
HCT VFR BLD AUTO: 29.5 % (ref 34–46.6)
HGB BLD-MCNC: 10 G/DL (ref 12–15.9)
IMM GRANULOCYTES # BLD AUTO: 0.02 10*3/MM3 (ref 0–0.05)
IMM GRANULOCYTES NFR BLD AUTO: 0.3 % (ref 0–0.5)
LYMPHOCYTES # BLD AUTO: 1.44 10*3/MM3 (ref 0.7–3.1)
LYMPHOCYTES NFR BLD AUTO: 19.7 % (ref 19.6–45.3)
MCH RBC QN AUTO: 33.3 PG (ref 26.6–33)
MCHC RBC AUTO-ENTMCNC: 33.9 G/DL (ref 31.5–35.7)
MCV RBC AUTO: 98.3 FL (ref 79–97)
MONOCYTES # BLD AUTO: 1.06 10*3/MM3 (ref 0.1–0.9)
MONOCYTES NFR BLD AUTO: 14.5 % (ref 5–12)
NEUTROPHILS NFR BLD AUTO: 4.73 10*3/MM3 (ref 1.7–7)
NEUTROPHILS NFR BLD AUTO: 64.7 % (ref 42.7–76)
NRBC BLD AUTO-RTO: 0 /100 WBC (ref 0–0.2)
PLATELET # BLD AUTO: 179 10*3/MM3 (ref 140–450)
PMV BLD AUTO: 9.8 FL (ref 6–12)
POTASSIUM SERPL-SCNC: 3.8 MMOL/L (ref 3.5–5.2)
RBC # BLD AUTO: 3 10*6/MM3 (ref 3.77–5.28)
SODIUM SERPL-SCNC: 138 MMOL/L (ref 136–145)
WBC # BLD AUTO: 7.31 10*3/MM3 (ref 3.4–10.8)

## 2021-03-16 PROCEDURE — 80048 BASIC METABOLIC PNL TOTAL CA: CPT | Performed by: NURSE PRACTITIONER

## 2021-03-16 PROCEDURE — 99024 POSTOP FOLLOW-UP VISIT: CPT | Performed by: ORTHOPAEDIC SURGERY

## 2021-03-16 PROCEDURE — 97110 THERAPEUTIC EXERCISES: CPT

## 2021-03-16 PROCEDURE — 25010000002 ENOXAPARIN PER 10 MG: Performed by: ORTHOPAEDIC SURGERY

## 2021-03-16 PROCEDURE — 85025 COMPLETE CBC W/AUTO DIFF WBC: CPT | Performed by: NURSE PRACTITIONER

## 2021-03-16 RX ADMIN — POLYETHYLENE GLYCOL 3350 17 G: 17 POWDER, FOR SOLUTION ORAL at 20:30

## 2021-03-16 RX ADMIN — SODIUM CHLORIDE, PRESERVATIVE FREE 10 ML: 5 INJECTION INTRAVENOUS at 10:05

## 2021-03-16 RX ADMIN — SODIUM CHLORIDE 75 ML/HR: 9 INJECTION, SOLUTION INTRAVENOUS at 22:18

## 2021-03-16 RX ADMIN — ENOXAPARIN SODIUM 40 MG: 40 INJECTION SUBCUTANEOUS at 17:36

## 2021-03-16 RX ADMIN — SERTRALINE 25 MG: 25 TABLET, FILM COATED ORAL at 20:30

## 2021-03-16 RX ADMIN — DOCUSATE SODIUM 50MG AND SENNOSIDES 8.6MG 2 TABLET: 8.6; 5 TABLET, FILM COATED ORAL at 20:30

## 2021-03-16 RX ADMIN — MIRTAZAPINE 7.5 MG: 15 TABLET, FILM COATED ORAL at 20:30

## 2021-03-16 RX ADMIN — DOCUSATE SODIUM 50MG AND SENNOSIDES 8.6MG 2 TABLET: 8.6; 5 TABLET, FILM COATED ORAL at 17:35

## 2021-03-16 RX ADMIN — DIVALPROEX SODIUM 125 MG: 125 CAPSULE, COATED PELLETS ORAL at 20:30

## 2021-03-16 RX ADMIN — DIVALPROEX SODIUM 125 MG: 125 CAPSULE, COATED PELLETS ORAL at 10:05

## 2021-03-16 RX ADMIN — DIVALPROEX SODIUM 125 MG: 125 CAPSULE, COATED PELLETS ORAL at 17:35

## 2021-03-16 RX ADMIN — SODIUM CHLORIDE, PRESERVATIVE FREE 10 ML: 5 INJECTION INTRAVENOUS at 20:31

## 2021-03-16 RX ADMIN — SODIUM CHLORIDE, PRESERVATIVE FREE 10 ML: 5 INJECTION INTRAVENOUS at 20:30

## 2021-03-16 NOTE — PLAN OF CARE
Goal Outcome Evaluation:         POD #2 IM nailing of hip after fall and fracture, dressing CDI, PWB, worked with PT but unable to follow directions related to baseline dementia, IV fluids continue related to poor intake, undecided DC plan

## 2021-03-16 NOTE — PROGRESS NOTES
Continued Stay Note  [facility]     Patient Name: Dee Dee Cox  MRN: [id number]  Today's Date: 3/16/2021    Admit Date: 3/14/2021    Discharge Plan     Row Name 03/16/21 1630       Plan    Plan  [facility] -- Accepted.    Patient/Family in Agreement with Plan  yes    Plan Comments  Spoke with Akiko/Shahida who has accepted the patient and will have a SNF bed for her tomorrow at [facility]. Updated the patient's /Huy who is agreeable and wants the patient to d/c to [facility]. Patient will need an ambulance to transport. No other needs identified at this time.        Discharge Codes    No documentation.             Disha Hearn RN

## 2021-03-16 NOTE — PROGRESS NOTES
Name: Dee Dee Cox ADMIT: 3/14/2021   : 1950  PCP: Zee Daley MD    MRN: [id number] LOS: 2 days   AGE/SEX: 70 y.o. female  ROOM: [facility]     Subjective   Subjective   CC: right hip deformity  no new events overnight. still with decreased appetite, didn't want any breakfast. tolerated some lunch.     Objective   Objective   Vital Signs  Temp:  [97.4 °F (36.3 °C)-98.8 °F (37.1 °C)] 97.8 °F (36.6 °C)  Heart Rate:  [67-92] 72  Resp:  [16] 16  BP: (114-162)/(65-68) 119/68  SpO2:  [93 %-98 %] 98 %  on   ;   Device (Oxygen Therapy): room air  Body mass index is 20.39 kg/m².  Physical Exam  Vitals and nursing note reviewed.   Constitutional:       General: She is not in acute distress.  HENT:      Head: Normocephalic and atraumatic.      Nose: Nose normal.      Mouth/Throat:      Mouth: Mucous membranes are moist.      Pharynx: Oropharynx is clear.   Eyes:      Conjunctiva/sclera: Conjunctivae normal.      Pupils: Pupils are equal, round, and reactive to light.   Cardiovascular:      Rate and Rhythm: Normal rate and regular rhythm.      Pulses: Normal pulses.   Pulmonary:      Effort: Pulmonary effort is normal.      Breath sounds: Normal breath sounds.   Abdominal:      General: Bowel sounds are normal.      Palpations: Abdomen is soft.      Tenderness: There is no abdominal tenderness.   Musculoskeletal:         General: No swelling or tenderness.      Cervical back: Normal range of motion and neck supple.      Comments: Surgical wounds dressed c/d/i   Skin:     General: Skin is warm and dry.      Capillary Refill: Capillary refill takes less than 2 seconds.   Neurological:      General: No focal deficit present.      Mental Status: She is alert. Mental status is at baseline.   Psychiatric:         Attention and Perception: She is inattentive.         Speech: She is noncommunicative.         Cognition and Memory: Cognition is impaired.       Results Review     I reviewed the patient's new clinical  results.  I reviewed the patient's XR right femur  Results from last 7 days   Lab Units 03/16/21  0552 03/15/21  0605 03/14/21  1037   WBC 10*3/mm3 7.31 9.42 11.39*   HEMOGLOBIN g/dL 10.0* 10.2* 13.2   PLATELETS 10*3/mm3 179 160 179     Results from last 7 days   Lab Units 03/16/21  0552 03/15/21  0605 03/14/21  1037   SODIUM mmol/L 138 139 140   POTASSIUM mmol/L 3.8 4.4 4.2   CHLORIDE mmol/L 105 105 102   CO2 mmol/L 25.6 27.1 32.6*   BUN mg/dL 11 17 14   CREATININE mg/dL 0.50* 0.52* 0.58   GLUCOSE mg/dL 76 89 97   Estimated Creatinine Clearance: 55.8 mL/min (A) (by C-G formula based on SCr of 0.5 mg/dL (L)).  Results from last 7 days   Lab Units 03/14/21  1037   ALBUMIN g/dL 3.40*   BILIRUBIN mg/dL 1.1   ALK PHOS U/L 69   AST (SGOT) U/L 21   ALT (SGPT) U/L 14     Results from last 7 days   Lab Units 03/16/21  0552 03/15/21  0605 03/14/21  1037   CALCIUM mg/dL 7.5* 7.9* 8.4*   ALBUMIN g/dL  --   --  3.40*       COVID19   Date Value Ref Range Status   03/14/2021 Not Detected Not Detected - Ref. Range Final     Glucose   Date/Time Value Ref Range Status   03/14/2021 1720 91 70 - 130 mg/dL Final       XR Femur 2 View Bilateral  RIGHT FEMUR X-RAYS     CLINICAL HISTORY: Postop internal fixation of proximal femoral fracture     5 views were obtained.      The images demonstrate internal fixation of a comminuted  intertrochanteric fracture of the proximal right femur with an  intramedullary grady with proximal and distal interlocking screws. The  proximal screw is a dynamic screw that extends into the femoral neck and  head. The major fracture fragments appear in anatomic alignment.     This report was finalized on 3/14/2021 4:53 PM by Dr. Kurtis Carmona M.D.     XR Femur 2 View Right  Narrative: XR FEMUR 2 VW RIGHT-, FL C ARM DURING SURGERY-     INDICATIONS: ORIF     TECHNIQUE: FLUOROSCOPIC ASSISTANCE IN THE OPERATING ROOM.     FINDINGS:     6 intraoperative fluoroscopic spot views were obtained and recorded the  PACS for  review, revealing grady and screw hardware fixation of right  intertrochanteric fracture. Please see operative report for full  details.     Fluoroscopy time: 167.7 seconds     Impression:    As described.     This report was finalized on 3/14/2021 4:25 PM by Dr. Hunter Arguello M.D.     FL C Arm During Surgery  Narrative: XR FEMUR 2 VW RIGHT-, FL C ARM DURING SURGERY-     INDICATIONS: ORIF     TECHNIQUE: FLUOROSCOPIC ASSISTANCE IN THE OPERATING ROOM.     FINDINGS:     6 intraoperative fluoroscopic spot views were obtained and recorded the  PACS for review, revealing grady and screw hardware fixation of right  intertrochanteric fracture. Please see operative report for full  details.     Fluoroscopy time: 167.7 seconds     Impression:    As described.     This report was finalized on 3/14/2021 4:25 PM by Dr. Hunter Arguello M.D.     XR Chest 1 View  Narrative: XR CHEST 1 VW-     HISTORY: Female who is 70 years-old,  preoperative evaluation     TECHNIQUE: Frontal view of the chest     COMPARISON: None available     FINDINGS: Heart, mediastinum and pulmonary vasculature are unremarkable.  No focal pulmonary consolidation, pleural effusion, or pneumothorax. No  acute osseous process.     Impression: No evidence for acute pulmonary process. Follow-up as  clinical indications persist.     This report was finalized on 3/14/2021 10:34 AM by Dr. Hunter Arguello M.D.     XR Hip With or Without Pelvis 2 - 3 View Right  Narrative: XR HIP W OR WO PELVIS 2-3 VIEW RIGHT-     INDICATIONS: Right hip pain     TECHNIQUE: 5 views including the pelvis and right hip     COMPARISON: None available     FINDINGS:     Angulated, comminuted right intertrochanteric fracture is noted. No  other fractures are identified. No dislocation. Hip joint spaces appear  preserved. Degenerative changes apparent in the partly included lumbar  spine.     Impression:    Right intertrochanteric fracture.     This report was finalized on 3/14/2021  10:13 AM by Dr. Hunter Arguello M.D.       Scheduled Medications  Divalproex Sodium, 125 mg, Oral, TID  enoxaparin, 40 mg, Subcutaneous, Q24H  mirtazapine, 7.5 mg, Oral, Nightly  polyethylene glycol, 17 g, Oral, Nightly  senna-docusate sodium, 2 tablet, Oral, BID  sertraline, 25 mg, Oral, Nightly  sodium chloride, 10 mL, Intravenous, Q12H  sodium chloride, 10 mL, Intravenous, Q12H  vitamin D, 50,000 Units, Oral, Q7 Days    Infusions  lactated ringers, 9 mL/hr, Last Rate: Stopped (03/15/21 0254)  sodium chloride, 75 mL/hr, Last Rate: 75 mL/hr (03/15/21 0254)    Diet  Diet Regular       Assessment/Plan     Active Hospital Problems    Diagnosis  POA   • Closed displaced intertrochanteric fracture of right femur (CMS/HCC) [S72.141A]  Yes   • Essential hypertension [I10]  Yes   • Hyperlipidemia [E78.5]  Yes   • DNR (do not resuscitate) [Z66]  Yes   • Early onset Alzheimer's dementia (CMS/HCC) [G30.0, F02.80]  Yes      Resolved Hospital Problems   No resolved problems to display.   Right Hip Fracture  - no apparent fall or other trauma at SNF  - s/p IM nailing 3/14/21  - pain control, PT  - encourage incentive spirometry but may be difficult due to dementia  - appreciate orthopedic surgery recs      HTN  - previously on propranolol but no longer taking  - monitor BP     Alzheimer's Dementia  - early onset, advanced  - continue on valproate  - continue remeron and sertraline  - monitor for sundowning and redirect as needed      Lovenox 40 mg SC daily for DVT prophylaxis.  DNR.  Discussed with patient and nursing staff.  Anticipate discharge as soon as  selects SNF and approved.       GIANCARLO Ross  [facility]  03/16/21  15:30 EDT

## 2021-03-16 NOTE — PLAN OF CARE
Goal Outcome Evaluation: Pt VSS, afebrile, POD1 Right hip IM nailing with rodding, no s/s of pain or discomfort, taking medications in pudding, speech eval done today with rec of regular diet with thins and no straws, Pt is also a feeder. Will cont to monitor.

## 2021-03-16 NOTE — THERAPY TREATMENT NOTE
Patient Name: Dee Dee Cox  : 1950    MRN: [id number]                              Today's Date: 3/16/2021       Admit Date: 3/14/2021    Visit Dx:     ICD-10-CM ICD-9-CM   1. Closed displaced intertrochanteric fracture of right femur, initial encounter (CMS/HCC)  S72.141A 820.21     Patient Active Problem List   Diagnosis   • Syncope   • Hypernatremia   • Early onset Alzheimer's dementia (CMS/[facility])   • Acute urinary retention   • Overflow incontinence   • Adverse effect of drug or medicament   • Accidental overdose   • Closed displaced intertrochanteric fracture of right femur (CMS/[facility])   • Essential hypertension   • Hyperlipidemia   • DNR (do not resuscitate)     Past Medical History:   Diagnosis Date   • Dementia (CMS/[facility])    • Hyperlipidemia    • Hypertension    • UTI (urinary tract infection)      Past Surgical History:   Procedure Laterality Date   • FEMUR IM NAILING/RODDING Right 3/14/2021    Procedure: FEMUR INTRAMEDULLARY NAILING/RODDING;  Surgeon: Allan Fox MD;  Location: [facility];  Service: Orthopedics;  Laterality: Right;     General Information     Row Name 21          Physical Therapy Time and Intention    Document Type  therapy note (daily note)  -     Mode of Treatment  individual therapy;physical therapy  -     Row Name 21          General Information    Patient Profile Reviewed  yes  -     Existing Precautions/Restrictions  fall;partial weight bearing  -     Row Name 21          Cognition    Orientation Status (Cognition)  disoriented to;person;place;time;situation  -     Row Name 21          Safety Issues, Functional Mobility    Safety Issues Affecting Function (Mobility)  ability to follow commands;problem-solving;safety precaution awareness  -     Impairments Affecting Function (Mobility)  balance;endurance/activity tolerance;postural/trunk control  -     Cognitive Impairments, Mobility Safety/Performance   attention;insight into deficits/self-awareness;judgment;problem-solving/reasoning  -       User Key  (r) = Recorded By, (t) = Taken By, (c) = Cosigned By    Initials Name Provider Type    Melany Milian PTA Physical Therapy Assistant        Mobility     Row Name 03/16/21 1837          Bed Mobility    Supine-Sit [place] (Bed Mobility)  2 person assist;moderate assist (50% patient effort);maximum assist (25% patient effort)  -     Comment (Bed Mobility)  pt having some conversation today, but garbled, seems concerned about something  -[facility] Name 03/16/21 1837          Bed-Chair Transfer    Bed-Chair [place] (Transfers)  unable to assess  -[facility] Name 03/16/21 1837          Sit-Stand Transfer    Sit-Stand [place] (Transfers)  2 person assist;maximum assist (25% patient effort) unable to perf more than 1 time due to unsure pt maintaining PWB  -       User Key  (r) = Recorded By, (t) = Taken By, (c) = Cosigned By    Initials Name Provider Type    Melany Milian PTA Physical Therapy Assistant        Obj/Interventions    No documentation.       Goals/Plan    No documentation.       Clinical Impression     Row Name 03/16/21 1839          Pain Scale: Numbers Pre/Post-Treatment    Pretreatment Pain Rating  0/10 - no pain  -     Posttreatment Pain Rating  3/10  -     Pre/Posttreatment Pain Comment  R LE sx, but pt grabbed LLE during tfer  -     Pain Intervention(s)  Repositioned;Rest  -[facility] Name 03/16/21 1839          Plan of Care Review    Plan of Care Reviewed With  patient  -     Outcome Summary  pt able to attempt standing at EOB x1, but req MAX 2/D  -[facility] Name 03/16/21 1839          Vital Signs    O2 Delivery Pre Treatment  room air  -JM     Row Name 03/16/21 1839          Positioning and Restraints    Pre-Treatment Position  in bed  -     Post Treatment Position  bed  -     In Bed  fowlers;call light within reach;encouraged to call for assist;exit alarm  on;notified nsg  -       User Key  (r) = Recorded By, (t) = Taken By, (c) = Cosigned By    Initials Name Provider Type    Melany Milian PTA Physical Therapy Assistant        Outcome Measures     Row Name 03/16/21 1841          How much help from another person do you currently need...    Turning from your back to your side while in flat bed without using bedrails?  2  -JM     Moving from lying on back to sitting on the side of a flat bed without bedrails?  2  -JM     Moving to and from a bed to a chair (including a wheelchair)?  1  -JM     Standing up from a chair using your arms (e.g., wheelchair, bedside chair)?  2  -JM     Climbing 3-5 steps with a railing?  1  -JM     To walk in hospital room?  1  -     AM-PAC 6 Clicks Score (PT)  9  -       User Key  (r) = Recorded By, (t) = Taken By, (c) = Cosigned By    Initials Name Provider Type    Melany Milian PTA Physical Therapy Assistant        Physical Therapy Education                 Title: PT OT SLP Therapies (In Progress)     Topic: Physical Therapy (In Progress)     Point: Mobility training (In Progress)     Learning Progress Summary           Patient Acceptance, E,D, NL by JOVON at 3/16/2021 1842    Acceptance, E,TB,D, NL by CABRERA at 3/15/2021 1210                   Point: Home exercise program (In Progress)     Learning Progress Summary           Patient Acceptance, E,D, NL by JOVON at 3/16/2021 1842                   Point: Body mechanics (In Progress)     Learning Progress Summary           Patient Acceptance, E,D, NL by JOVON at 3/16/2021 1842                   Point: Precautions (In Progress)     Learning Progress Summary           Patient Acceptance, E,D, NL by JOVON at 3/16/2021 1842                               User Key     Initials Effective Dates Name Provider Type [facility] 03/07/18 -  Melany Ríos PTA Physical Therapy Assistant PT    CABRERA 04/03/18 -  Nikkie Lugo PT Physical Therapist PT              PT Recommendation and  Plan     Plan of Care Reviewed With: patient  Outcome Summary: pt able to attempt standing at EOB x1, but req MAX 2/D     Time Calculation:   PT Charges     Row Name 03/16/21 1835             Time Calculation    Start Time  1520  -JOVON      Stop Time  1530  -JOVON      Time Calculation (min)  10 min  -JOVON      PT Received On  03/16/21  -JOVON      PT - Next Appointment  03/17/21  -JOVON        User Key  (r) = Recorded By, (t) = Taken By, (c) = Cosigned By    Initials Name Provider Type    Melany Milian PTA Physical Therapy Assistant        Therapy Charges for Today     Code Description Service Date Service Provider Modifiers Qty    88692426134 HC PT THER PROC EA 15 MIN 3/16/2021 Melany íRos PTA GP 1    19453225660 HC PT THER SUPP EA 15 MIN 3/16/2021 Melany Ríos PTA GP 1          PT G-Codes  Outcome Measure Options: AM-PAC 6 Clicks Basic Mobility (PT)  AM-PAC 6 Clicks Score (PT): 9    Melany Ríos PTA  3/16/2021

## 2021-03-16 NOTE — PLAN OF CARE
Goal Outcome Evaluation:  Plan of Care Reviewed With: patient  Progress: no change  Outcome Summary: VSS during shift. Dressing CDI. Turn q 2 hours. Patient confused and will not follow commands. No s/s of pain at this time.

## 2021-03-16 NOTE — PROGRESS NOTES
Orthopedic Progress Note      Patient: Dee Dee Cox    YOB: 1950    Medical Record Number: [id number]    Attending Physician: Trino Grubbs MD    Date of Admission: 3/14/2021  8:38 AM    Admitting Dx:  Closed displaced intertrochanteric fracture of right femur, initial encounter (CMS/HCC) [S72.141A]    Status Post: Cephalomedullary fixation of right intertrochanteric femur     Post Operative Day Number: 2    Current Problem List:   Patient Active Problem List   Diagnosis    Syncope    Hypernatremia    Early onset Alzheimer's dementia (CMS/[facility])    Acute urinary retention    Overflow incontinence    Adverse effect of drug or medicament    Accidental overdose    Closed displaced intertrochanteric fracture of right femur (CMS/[facility])    Essential hypertension    Hyperlipidemia    DNR (do not resuscitate)         Past Medical History:   Diagnosis Date    Dementia (CMS/[facility])     Hyperlipidemia     Hypertension     UTI (urinary tract infection)        Current Medications:  Scheduled Meds:Divalproex Sodium, 125 mg, Oral, TID  enoxaparin, 40 mg, Subcutaneous, Q24H  mirtazapine, 7.5 mg, Oral, Nightly  polyethylene glycol, 17 g, Oral, Nightly  senna-docusate sodium, 2 tablet, Oral, BID  sertraline, 25 mg, Oral, Nightly  sodium chloride, 10 mL, Intravenous, Q12H  sodium chloride, 10 mL, Intravenous, Q12H  vitamin D, 50,000 Units, Oral, Q7 Days      PRN Meds:.  acetaminophen **OR** acetaminophen **OR** acetaminophen    ALPRAZolam    bisacodyl    bisacodyl    calcium carbonate    HYDROcodone-acetaminophen    HYDROmorphone **AND** naloxone    melatonin    ondansetron **OR** ondansetron    [COMPLETED] Insert peripheral IV **AND** sodium chloride    sodium chloride    sodium chloride    SUBJECTIVE: 70 y.o.  female.  Opens eyes when name called otherwise remains nonverbal.    OBJECTIVE:   Vitals:    03/15/21 1929 03/15/21 2254 03/16/21 0309 03/16/21 0721   BP: 151/67 126/67 139/65 162/67   BP Location: Left arm  Left arm Left arm Right arm   Patient Position: Lying Lying Lying Lying   Pulse: 92 86 67 70   Resp: 16 16 16 16   Temp: 98.8 °F (37.1 °C) 98 °F (36.7 °C) 98 °F (36.7 °C) 97.4 °F (36.3 °C)   TempSrc: Axillary Skin Skin Oral   SpO2: 96% 97% 98% 93%   Weight:       Height:         I/O last 3 completed shifts:  In: 523 [P.O.:120; I.V.:403]  Out: -     Diagnostic Tests:   Lab Results (last 24 hours)       Procedure Component Value Units Date/Time    Basic Metabolic Panel [[id number]]  (Abnormal) Collected: 03/16/21 0552    Specimen: Blood Updated: 03/16/21 0635     Glucose 76 mg/dL      BUN 11 mg/dL      Creatinine 0.50 mg/dL      Sodium 138 mmol/L      Potassium 3.8 mmol/L      Chloride 105 mmol/L      CO2 25.6 mmol/L      Calcium 7.5 mg/dL      eGFR Non African Amer 121 mL/min/1.73      BUN/Creatinine Ratio 22.0     Anion Gap 7.4 mmol/L     Narrative:      GFR Normal >60  Chronic Kidney Disease <60  Kidney Failure <15      CBC & Differential [[id number]]  (Abnormal) Collected: 03/16/21 0552    Specimen: Blood Updated: 03/16/21 0604    Narrative:      The following orders were created for panel order CBC & Differential.  Procedure                               Abnormality         Status                     ---------                               -----------         ------                     CBC Auto Differential[[id number]]        Abnormal            Final result                 Please view results for these tests on the individual orders.    CBC Auto Differential [[id number]]  (Abnormal) Collected: 03/16/21 0552    Specimen: Blood Updated: 03/16/21 0604     WBC 7.31 10*3/mm3      RBC 3.00 10*6/mm3      Hemoglobin 10.0 g/dL      Hematocrit 29.5 %      MCV 98.3 fL      MCH 33.3 pg      MCHC 33.9 g/dL      RDW 12.9 %      RDW-SD 45.2 fl      MPV 9.8 fL      Platelets 179 10*3/mm3      Neutrophil % 64.7 %      Lymphocyte % 19.7 %      Monocyte % 14.5 %      Eosinophil % 0.4 %      Basophil % 0.4 %      Immature Grans % 0.3 %       Neutrophils, Absolute 4.73 10*3/mm3      Lymphocytes, Absolute 1.44 10*3/mm3      Monocytes, Absolute 1.06 10*3/mm3      Eosinophils, Absolute 0.03 10*3/mm3      Basophils, Absolute 0.03 10*3/mm3      Immature Grans, Absolute 0.02 10*3/mm3      nRBC 0.0 /100 WBC             PHYSICAL EXAM: Right hip and thigh dressing remain dry and intact.  Thigh and calf are soft and nontender.  Foot is warm and dry with strong pulses.  Unable to get the patient to cooperate with neurovascular exam.  She is presently lying in bed and does not appear to be in any acute distress.  Hemoglobin is 10.0.     ASSESSMENT & PLAN:    Slow progress with PT as expected.  It is reported that the patient was an ambulatory prior to her fall.    Okay to transfer to skilled nursing facility anytime from orthopedic standpoint.  Patient is to return to the office in 2 to 3 weeks for follow-up    Date: 3/16/2021    Genny Lance RN      After seeing the patient , I reviewed and agree with the above.    Allan Fox MD

## 2021-03-17 VITALS
OXYGEN SATURATION: 92 % | HEART RATE: 67 BPM | TEMPERATURE: 97.3 F | DIASTOLIC BLOOD PRESSURE: 66 MMHG | BODY MASS INDEX: 20.41 KG/M2 | WEIGHT: 122.5 LBS | RESPIRATION RATE: 16 BRPM | HEIGHT: 65 IN | SYSTOLIC BLOOD PRESSURE: 137 MMHG

## 2021-03-17 RX ORDER — BISACODYL 10 MG
10 SUPPOSITORY, RECTAL RECTAL DAILY PRN
Start: 2021-03-17

## 2021-03-17 RX ORDER — BISACODYL 5 MG/1
5 TABLET, DELAYED RELEASE ORAL DAILY PRN
Start: 2021-03-17

## 2021-03-17 RX ORDER — ACETAMINOPHEN 325 MG/1
650 TABLET ORAL EVERY 4 HOURS PRN
Start: 2021-03-17

## 2021-03-17 RX ORDER — CALCIUM CARBONATE 200(500)MG
2 TABLET,CHEWABLE ORAL 2 TIMES DAILY PRN
Start: 2021-03-17

## 2021-03-17 RX ORDER — HYDROCODONE BITARTRATE AND ACETAMINOPHEN 5; 325 MG/1; MG/1
1 TABLET ORAL EVERY 4 HOURS PRN
Qty: 10 TABLET | Refills: 0 | Status: SHIPPED | OUTPATIENT
Start: 2021-03-17

## 2021-03-17 RX ADMIN — DOCUSATE SODIUM 50MG AND SENNOSIDES 8.6MG 2 TABLET: 8.6; 5 TABLET, FILM COATED ORAL at 09:20

## 2021-03-17 RX ADMIN — SODIUM CHLORIDE, PRESERVATIVE FREE 10 ML: 5 INJECTION INTRAVENOUS at 09:20

## 2021-03-17 RX ADMIN — DIVALPROEX SODIUM 125 MG: 125 CAPSULE, COATED PELLETS ORAL at 09:20

## 2021-03-17 NOTE — PLAN OF CARE
Goal Outcome Evaluation:  Plan of Care Reviewed With: patient  Progress: no change  Outcome Summary: POD 2 R femur IM nailing. VSS, no nonverbal or verbal cues noted for pain. turned and respositioned q2h, incontinence care provided as needed. plans to d/c to rehab for continued care when ready. in bed resting quietly, call light inreach, will continue to monitor.

## 2021-03-17 NOTE — PLAN OF CARE
Goal Outcome Evaluation:         POD #3 IM nailing right hip, 3 incision sites CDI, 4 x 4 with transparent dressing, large BM this am, voiding incontinently, PWB, needs reinforcement of teaching related to dementia, plan is for DC to rehab today, needs ems transport

## 2021-03-17 NOTE — PROGRESS NOTES
"Physicians Statement of Medical Necessity for  Ambulance Transportation    GENERAL INFORMATION     Name: Dee Dee Cox  YOB: 1950  Medicare #: [id number] (See Facesheet)  Transport Date: 3/17/2021 (Valid for round trips this date, or for scheduled repetitive trips for 60 days from the date signed below.)  Origin: [facility]  Destination: [place] [facility].  Is the Patient's stay covered under Medicare Part A (PPS/DRG?)Yes  Closest appropriate facility? Yes  If this a hosp-hosp transfer? No  Is this a hospice patient? No    MEDICAL NECESSITY QUESTIONAIRE    Ambulance Transportation is medically necessary only if other means of transportation are contraindicated or would be potentially harmful to the patient.  To meet this requirement, the patient must be either \"bed confined\" or suffer from a condition such that transport by means other than an ambulance is contraindicated by the patient's condition.  The following questions must be answered by the healthcare professional signing below for this form to be valid:     1) Describe the MEDICAL CONDITION (physical and/or mental) of this patient AT THE TIME OF AMBULANCE TRANSPORT that requires the patient to be transported in an ambulance, and why transport by other means is contraindicated by the patient's condition:   Past Medical History:   Diagnosis Date   • Dementia (CMS/HCC)    • Hyperlipidemia    • Hypertension    • UTI (urinary tract infection)       Past Surgical History:   Procedure Laterality Date   • FEMUR IM NAILING/RODDING Right 3/14/2021    Procedure: FEMUR INTRAMEDULLARY NAILING/RODDING;  Surgeon: Allan Fox MD;  Location: [facility];  Service: Orthopedics;  Laterality: Right;      2) Is this patient \"bed confined\" as defined below?Yes   To be \"bed confined\" the patient must satisfy all three of the following criteria:  (1) unable to get up from bed without assistance; AND (2) unable to ambulate;  AND (3) " unable to sit in a chair or wheelchair.  3) Can this patient safely be transported by car or wheelchair van (I.e., may safely sit during transport, without an attendant or monitoring?)No   4. In addition to completing questions 1-3 above, please check any of the following conditions that apply*:          *Note: supporting documentation for any boxes checked must be maintained in the patient's medical records Patient is confused, Unable to tolerate seated position for time needed to transport and Other Status post cephalomedullary fixation of right intertrochanteric femur fracture. Patient has a baseline history of Alzheimer's Dementia. Patient is confused which limits her ability to safely follow instructions.      SIGNATURE OF PHYSICIAN OR OTHER AUTHORIZED HEALTHCARE PROFESSIONAL    I certify that the above information is true and correct based on my evaluation of this patient, and represent that the patient requires transport by ambulance and that other forms of transport are contraindicated.  I understand that this information will be used by the Centers for Medicare and Medicaid Services (CMS) to support the determiniation of medical necessity for ambulance services, and I represent that I have personal knowledge of the patient's condition at the time of transport.       If this box is checked, I also certify that the patient is physically or mentally incapable of signing the ambulance service's claim form and that the institution with which I am affiliated has furnished care, services or assistance to the patient.  My signature below is made on behalf of the patient pursuant to 42 .36(b)(4). In accordance with 42 .37, the specific reason(s) that the patient is physically or mentally incapable of signing the claim for is as follows:     Signature of Physician or Healthcare Professional  Date/Time:        (For Scheduled repetitive transport, this form is not valid for transports performed more than  60 days after this date).                                                                                                                                            --------------------------------------------------------------------------------------------  Printed Name and Credentials of Physician or Authorized Healthcare Professional     *Form must be signed by patient's attending physician for scheduled, repetitive transports,.  For non-repetitive ambulance transports, if unable to obtain the signature of the attending physician, any of the following may sign (please select below):     Physician  Clinical Nurse Specialist  Registered Nurse     Physician Assistant  Discharge Planner  Licensed Practical Nurse     Nurse Practitioner

## 2021-03-17 NOTE — DISCHARGE SUMMARY
Patient Name: Dee Dee Cox  : 1950  MRN: [id number]    Date of Admission: 3/14/2021  Date of Discharge:  3/17/2021  Primary Care Physician: Zee Daley MD      Chief Complaint:   Hip Pain (right hip pain)      Discharge Diagnoses     Active Hospital Problems    Diagnosis  POA   • Closed displaced intertrochanteric fracture of right femur (CMS/HCC) [S72.141A]  Yes   • Essential hypertension [I10]  Yes   • Hyperlipidemia [E78.5]  Yes   • DNR (do not resuscitate) [Z66]  Yes   • Early onset Alzheimer's dementia (CMS/[facility]) [G30.0, F02.80]  Yes      Resolved Hospital Problems   No resolved problems to display.        Hospital Course     Ms. Cox is a 70 y.o. female with a history of HTN, HLD, and early onset Alzheimer's Dementia essentially non-verbal at baseline who presented to [facility] after nurses at facility discovered a right hip deformity.  Please see the admitting history and physical for further details.  She was found to have a right femur fracture and was admitted to the hospital for further evaluation and treatment.  Orthopedic surgery saw in consultation who preformed an IM nailing on 3/14/21 without complication. Physical therapy evaluated the patient and thought she would benefit from skilled therapy at discharge in which she is stable for today. She will need to follow up with her PCP in 1-2 weeks and ortho in 2-3 weeks.       Day of Discharge     Subjective:  resting in bed, no events overnight.     ROS unobtainable secondary to dementia.     Physical Exam:  Temp:  [97.3 °F (36.3 °C)-98.2 °F (36.8 °C)] 97.3 °F (36.3 °C)  Heart Rate:  [67-84] 67  Resp:  [16] 16  BP: (119-161)/(65-74) 137/66  Body mass index is 20.39 kg/m².  Physical Exam  Vitals and nursing note reviewed.   Constitutional:       Appearance: She is ill-appearing (chronically).   HENT:      Head: Normocephalic and atraumatic.   Eyes:      Extraocular Movements: Extraocular movements intact.       Conjunctiva/sclera: Conjunctivae normal.   Cardiovascular:      Rate and Rhythm: Normal rate and regular rhythm.   Pulmonary:      Effort: Pulmonary effort is normal. No respiratory distress.      Breath sounds: Normal breath sounds.   Abdominal:      General: Bowel sounds are normal. There is no distension.      Palpations: Abdomen is soft.      Tenderness: There is no abdominal tenderness.   Musculoskeletal:         General: No swelling. Normal range of motion.      Cervical back: Normal range of motion and neck supple.   Skin:     General: Skin is warm and dry.   Neurological:      Mental Status: She is alert. Mental status is at baseline. She is disoriented.   Psychiatric:         Speech: She is noncommunicative.         Cognition and Memory: Memory is impaired.         Consultants     Consult Orders (all) (From admission, onward)     Start     Ordered    03/15/21 0457  Inpatient Case Management  Consult  Once     Provider:  (Not yet assigned)    03/15/21 0457    03/14/21 1034  [facility] (on-call MD unless specified) Details  Once     Specialty:  Hospitalist  Provider:  (Not yet assigned)    03/14/21 1034    03/14/21 0941  Ortho (on-call MD unless specified)  Once     Specialty:  Orthopedic Surgery  Provider:  Allan Fox MD    03/14/21 0941              Procedures     FEMUR INTRAMEDULLARY NAILING/RODDING      Imaging Results (All)     Procedure Component Value Units Date/Time    XR Femur 2 View Bilateral [[id number]] Collected: 03/14/21 1652     Updated: 03/14/21 1656    Narrative:      RIGHT FEMUR X-RAYS     CLINICAL HISTORY: Postop internal fixation of proximal femoral fracture     5 views were obtained.      The images demonstrate internal fixation of a comminuted  intertrochanteric fracture of the proximal right femur with an  intramedullary grady with proximal and distal interlocking screws. The  proximal screw is a dynamic screw that extends into the femoral neck and  head. The major fracture  fragments appear in anatomic alignment.     This report was finalized on 3/14/2021 4:53 PM by Dr. Kurtis Carmona M.D.       FL C Arm During Surgery [[id number]] Collected: 03/14/21 1624     Updated: 03/14/21 1628    Narrative:      XR FEMUR 2 VW RIGHT-, FL C ARM DURING SURGERY-     INDICATIONS: ORIF     TECHNIQUE: FLUOROSCOPIC ASSISTANCE IN THE OPERATING ROOM.     FINDINGS:     6 intraoperative fluoroscopic spot views were obtained and recorded the  PACS for review, revealing grady and screw hardware fixation of right  intertrochanteric fracture. Please see operative report for full  details.     Fluoroscopy time: 167.7 seconds       Impression:         As described.     This report was finalized on 3/14/2021 4:25 PM by Dr. Hunter Arguello M.D.       XR Femur 2 View Right [[id number]] Collected: 03/14/21 1624     Updated: 03/14/21 1628    Narrative:      XR FEMUR 2 VW RIGHT-, FL C ARM DURING SURGERY-     INDICATIONS: ORIF     TECHNIQUE: FLUOROSCOPIC ASSISTANCE IN THE OPERATING ROOM.     FINDINGS:     6 intraoperative fluoroscopic spot views were obtained and recorded the  PACS for review, revealing grady and screw hardware fixation of right  intertrochanteric fracture. Please see operative report for full  details.     Fluoroscopy time: 167.7 seconds       Impression:         As described.     This report was finalized on 3/14/2021 4:25 PM by Dr. Hunter Arguello M.D.       XR Chest 1 View [[id number]] Collected: 03/14/21 1026     Updated: 03/14/21 1037    Narrative:      XR CHEST 1 VW-     HISTORY: Female who is 70 years-old,  preoperative evaluation     TECHNIQUE: Frontal view of the chest     COMPARISON: None available     FINDINGS: Heart, mediastinum and pulmonary vasculature are unremarkable.  No focal pulmonary consolidation, pleural effusion, or pneumothorax. No  acute osseous process.       Impression:      No evidence for acute pulmonary process. Follow-up as  clinical indications persist.     This report  was finalized on 3/14/2021 10:34 AM by Dr. Hunter Arguello M.D.       XR Hip With or Without Pelvis 2 - 3 View Right [[id number]] Collected: 03/14/21 1012     Updated: 03/14/21 1016    Narrative:      XR HIP W OR WO PELVIS 2-3 VIEW RIGHT-     INDICATIONS: Right hip pain     TECHNIQUE: 5 views including the pelvis and right hip     COMPARISON: None available     FINDINGS:     Angulated, comminuted right intertrochanteric fracture is noted. No  other fractures are identified. No dislocation. Hip joint spaces appear  preserved. Degenerative changes apparent in the partly included lumbar  spine.       Impression:         Right intertrochanteric fracture.     This report was finalized on 3/14/2021 10:13 AM by Dr. Hunter Arguello M.D.               Pertinent Labs     Results from last 7 days   Lab Units 03/16/21  0552 03/15/21  0605 03/14/21  1037   WBC 10*3/mm3 7.31 9.42 11.39*   HEMOGLOBIN g/dL 10.0* 10.2* 13.2   PLATELETS 10*3/mm3 179 160 179     Results from last 7 days   Lab Units 03/16/21  0552 03/15/21  0605 03/14/21  1037   SODIUM mmol/L 138 139 140   POTASSIUM mmol/L 3.8 4.4 4.2   CHLORIDE mmol/L 105 105 102   CO2 mmol/L 25.6 27.1 32.6*   BUN mg/dL 11 17 14   CREATININE mg/dL 0.50* 0.52* 0.58   GLUCOSE mg/dL 76 89 97   Estimated Creatinine Clearance: 55.8 mL/min (A) (by C-G formula based on SCr of 0.5 mg/dL (L)).  Results from last 7 days   Lab Units 03/14/21  1037   ALBUMIN g/dL 3.40*   BILIRUBIN mg/dL 1.1   ALK PHOS U/L 69   AST (SGOT) U/L 21   ALT (SGPT) U/L 14     Results from last 7 days   Lab Units 03/16/21  0552 03/15/21  0605 03/14/21  1037   CALCIUM mg/dL 7.5* 7.9* 8.4*   ALBUMIN g/dL  --   --  3.40*               Invalid input(s): LDLCALC      Results from last 7 days   Lab Units 03/14/21  1038   COVID19  Not Detected       Test Results Pending at Discharge       Discharge Details        Discharge Medications      New Medications      Instructions Start Date   acetaminophen 325 MG  tablet  Commonly known as: TYLENOL   650 mg, Oral, Every 4 Hours PRN      bisacodyl 5 MG EC tablet  Commonly known as: DULCOLAX   5 mg, Oral, Daily PRN      bisacodyl 10 MG suppository  Commonly known as: DULCOLAX   10 mg, Rectal, Daily PRN      calcium carbonate 500 MG chewable tablet  Commonly known as: TUMS   2 tablets, Oral, 2 Times Daily PRN      enoxaparin 40 MG/0.4ML solution syringe  Commonly known as: LOVENOX   1 injection subcu daily for 21 days      HYDROcodone-acetaminophen 5-325 MG per tablet  Commonly known as: NORCO   1 tablet, Oral, Every 4 Hours PRN      vitamin D 1.25 MG (56174 UT) capsule capsule  Commonly known as: ERGOCALCIFEROL   50,000 Units, Oral, Every 7 Days         Continue These Medications      Instructions Start Date   Divalproex Sodium 125 MG capsule  Commonly known as: DEPAKOTE SPRINKLE   125 mg, Oral, 3 Times Daily, Give 2 capsule by mouth three times a day for seizures.       MiraLax 17 g packet  Generic drug: polyethylene glycol   17 g, Oral, Nightly      mirtazapine 15 MG tablet  Commonly known as: REMERON   7.5 mg, Oral, Nightly      sennosides-docusate 8.6-50 MG per tablet  Commonly known as: PERICOLACE   1 tablet, Oral, 2 Times Daily      sertraline 25 MG tablet  Commonly known as: ZOLOFT   25 mg, Oral, Nightly             Allergies   Allergen Reactions   • Cyproheptadine Rash     unknown   • Levaquin [Levofloxacin] Rash     unknown         Discharge Disposition:  Skilled Nursing Facility (DC - External)    Discharge Diet:  Diet Order   Procedures   • Diet Regular       Discharge Activity:       CODE STATUS:    Code Status and Medical Interventions:   Ordered at: 03/14/21 1222     Limited Support to NOT Include:    Cardioversion/Defibrillation    Intubation     Code Status:    No CPR     Medical Interventions (Level of Support Prior to Arrest):    Limited       No future appointments.  Additional Instructions for the Follow-ups that You Need to Schedule     Discharge Follow-up  with Specialty: Orthopedics; 2 Weeks   As directed      Specialty: Orthopedics    Follow Up: 2 Weeks    Follow Up Details: Turn to the office to see Dr. Allan Fox.            Contact information for follow-up providers     Zee Daley MD .    Specialty: Internal Medicine  Contact information:  [address]  [facility] [address]  [facility] [address]  [phone]                   Contact information for after-discharge care     Destination     [facility] [facility] [place] .    Service: Skilled Nursing  Contact information:  [address]   [facility] [address] [phone]                             Additional Instructions for the Follow-ups that You Need to Schedule     Discharge Follow-up with Specialty: Orthopedics; 2 Weeks   As directed      Specialty: Orthopedics    Follow Up: 2 Weeks    Follow Up Details: Turn to the office to see Dr. Allan Fox.           Time Spent on Discharge:  Greater than 30 minutes      GIANCARLO Ross  [facility]  03/17/21  10:52 EDT

## 2021-03-17 NOTE — PROGRESS NOTES
Continued Stay Note  [facility]     Patient Name: Dee Dee Cox  MRN: [id number]  Today's Date: 3/17/2021    Admit Date: 3/14/2021    Discharge Plan     Row Name 03/17/21 1008       Plan    Plan  [facility] -- Accepted.    Patient/Family in Agreement with Plan  yes    Plan Comments  Spoke with Akiko/Shahida who said she has a SNF bed for the patient today and the patient is ok for d/c today. Scheduled a [facility] to transport the patient today at 1230 (spoke with Fletcher). Updated the patient's /Huy and nurse/DYLAN Paulino who are both agreeable. [facility] gave /Huy the disclaimer that [facility] cannot guarantee insurance will cover the cost of the ambulance transport. /Huy is agreeable, acknowledges understanding and wants to continue with the ambulance transport. [facility] also informed /Huy that, per Connor/Gregorio, [facility] will have to discontinue services once the patient is discharged to a SNF. /Huy is agreeable. No other needs identified. Packet is on the chart.    Final Discharge Disposition Code  03 - skilled nursing facility (SNF)    Final Note  Patient's discharging to [facility]. [facility] to transport.        Discharge Codes    No documentation.       Expected Discharge Date and Time     Expected Discharge Date Expected Discharge Time    Mar 17, 2021             Disha Hearn RN

## 2024-01-24 NOTE — ED TRIAGE NOTES
Per EMS  reports that at 0800 he heard the pt fall in the kitchen and found her unconscious and had urinated on herself.  then moved the pt to the living room and called her PCP which told him to call 911. EMS states that the pt has been responsive for them but is hesitant/slow to answer questions. Pupils were pinpoint and pt received 2mg IV narcan with no response. Pt appears to be in NAD at this time, skin is PWD, and breathing is even and unlabored.    Male

## (undated) DEVICE — GLV SURG SENSICARE PI LF PF 8 GRN STRL

## (undated) DEVICE — K-WIRE
Type: IMPLANTABLE DEVICE | Site: FEMUR | Status: NON-FUNCTIONAL
Removed: 2021-03-14

## (undated) DEVICE — DRSNG SURESITE WNDW 4X4.5

## (undated) DEVICE — MAT FLR ABSORBENT LG 4FT 10 2.5FT

## (undated) DEVICE — SUT ETHLN 3/0 PSL BLK MONO SA 30IN 1691H

## (undated) DEVICE — LAG SCREW STEP DRILL: Brand: GAMMA

## (undated) DEVICE — DRILL, AO, STERILE

## (undated) DEVICE — DRP C/ARMOR

## (undated) DEVICE — DRSNG GZ PETROLTM XEROFORM CURAD 1X8IN STRL

## (undated) DEVICE — SPNG GZ WOVN 4X4IN 12PLY 10/BX STRL

## (undated) DEVICE — NEEDLE, QUINCKE, 20GX3.5": Brand: MEDLINE

## (undated) DEVICE — GUIDE WIRE, BALL-TIPPED, STERILE

## (undated) DEVICE — 1010 S-DRAPE TOWEL DRAPE 10/BX: Brand: STERI-DRAPE™

## (undated) DEVICE — SOL ISO/ALC RUB 70PCT 4OZ

## (undated) DEVICE — BNDG ELAS ELITE V/CLOSE 6IN 5YD LF STRL

## (undated) DEVICE — TRAP FLD MINIVAC MEGADYNE 100ML

## (undated) DEVICE — UNDERCAST PADDING: Brand: DEROYAL

## (undated) DEVICE — PK HIP PINNING 40

## (undated) DEVICE — STPLR SKIN VISISTAT WD 35CT

## (undated) DEVICE — SUT MNCRYL 2/0 SH 27IN Y417H

## (undated) DEVICE — SUT VIC 2/0 CT2 27IN J269H

## (undated) DEVICE — LOCKING SCREW, FULLY THREADED
Type: IMPLANTABLE DEVICE | Site: FEMUR | Status: NON-FUNCTIONAL
Removed: 2021-03-14

## (undated) DEVICE — GLV SURG BIOGEL LTX PF 8

## (undated) DEVICE — DRAPE,REIN 53X77,STERILE: Brand: MEDLINE

## (undated) DEVICE — APPL DURAPREP IODOPHOR APL 26ML

## (undated) DEVICE — VITAL SIGNS™ ADULT ANESTHESIA BREATHING CIRCUIT: Brand: VITAL SIGNS™

## (undated) DEVICE — SUT VIC 0 CT1 36IN J946H